# Patient Record
Sex: MALE | Race: WHITE | NOT HISPANIC OR LATINO | Employment: OTHER | URBAN - METROPOLITAN AREA
[De-identification: names, ages, dates, MRNs, and addresses within clinical notes are randomized per-mention and may not be internally consistent; named-entity substitution may affect disease eponyms.]

---

## 2024-04-25 ENCOUNTER — OFFICE VISIT (OUTPATIENT)
Dept: CARDIOLOGY | Facility: CLINIC | Age: 69
End: 2024-04-25
Payer: MEDICARE

## 2024-04-25 ENCOUNTER — HOSPITAL ENCOUNTER (OUTPATIENT)
Dept: VASCULAR MEDICINE | Facility: CLINIC | Age: 69
Discharge: HOME | End: 2024-04-25
Payer: MEDICARE

## 2024-04-25 VITALS
SYSTOLIC BLOOD PRESSURE: 130 MMHG | DIASTOLIC BLOOD PRESSURE: 80 MMHG | HEIGHT: 69 IN | HEART RATE: 91 BPM | WEIGHT: 245 LBS | BODY MASS INDEX: 36.29 KG/M2 | OXYGEN SATURATION: 97 %

## 2024-04-25 DIAGNOSIS — E55.9 VITAMIN D DEFICIENCY: ICD-10-CM

## 2024-04-25 DIAGNOSIS — E78.49 OTHER HYPERLIPIDEMIA: ICD-10-CM

## 2024-04-25 DIAGNOSIS — M79.605 BILATERAL LOWER EXTREMITY PAIN: ICD-10-CM

## 2024-04-25 DIAGNOSIS — M79.604 BILATERAL LOWER EXTREMITY PAIN: ICD-10-CM

## 2024-04-25 DIAGNOSIS — I10 ESSENTIAL HYPERTENSION: ICD-10-CM

## 2024-04-25 DIAGNOSIS — I87.2 VENOUS INSUFFICIENCY: ICD-10-CM

## 2024-04-25 DIAGNOSIS — R60.0 BILATERAL LEG EDEMA: ICD-10-CM

## 2024-04-25 DIAGNOSIS — E83.42 HYPOMAGNESEMIA: ICD-10-CM

## 2024-04-25 DIAGNOSIS — I25.10 CORONARY ARTERY DISEASE INVOLVING NATIVE CORONARY ARTERY OF NATIVE HEART WITHOUT ANGINA PECTORIS: Primary | ICD-10-CM

## 2024-04-25 LAB — BODY SURFACE AREA: 2.33 M2

## 2024-04-25 PROCEDURE — 1159F MED LIST DOCD IN RCRD: CPT | Performed by: INTERNAL MEDICINE

## 2024-04-25 PROCEDURE — 93971 EXTREMITY STUDY: CPT

## 2024-04-25 PROCEDURE — 3075F SYST BP GE 130 - 139MM HG: CPT | Performed by: INTERNAL MEDICINE

## 2024-04-25 PROCEDURE — 93971 EXTREMITY STUDY: CPT | Performed by: INTERNAL MEDICINE

## 2024-04-25 PROCEDURE — 3079F DIAST BP 80-89 MM HG: CPT | Performed by: INTERNAL MEDICINE

## 2024-04-25 PROCEDURE — 1036F TOBACCO NON-USER: CPT | Performed by: INTERNAL MEDICINE

## 2024-04-25 PROCEDURE — 99214 OFFICE O/P EST MOD 30 MIN: CPT | Performed by: INTERNAL MEDICINE

## 2024-04-25 RX ORDER — METFORMIN HYDROCHLORIDE 1000 MG/1
1000 TABLET ORAL 2 TIMES DAILY
COMMUNITY
Start: 2023-05-12

## 2024-04-25 RX ORDER — ACETAMINOPHEN 500 MG
2000 TABLET ORAL
COMMUNITY
Start: 2023-12-06 | End: 2024-04-25 | Stop reason: SDUPTHER

## 2024-04-25 RX ORDER — METOPROLOL SUCCINATE 25 MG/1
25 TABLET, EXTENDED RELEASE ORAL
COMMUNITY
Start: 2023-06-04

## 2024-04-25 RX ORDER — INSULIN GLARGINE 100 [IU]/ML
INJECTION, SOLUTION SUBCUTANEOUS
COMMUNITY

## 2024-04-25 RX ORDER — ATORVASTATIN CALCIUM 20 MG/1
20 TABLET, FILM COATED ORAL DAILY
COMMUNITY

## 2024-04-25 RX ORDER — IBUPROFEN 100 MG/5ML
1 SUSPENSION, ORAL (FINAL DOSE FORM) ORAL
COMMUNITY
Start: 2023-04-04

## 2024-04-25 RX ORDER — BLOOD-GLUCOSE METER
KIT MISCELLANEOUS
COMMUNITY
Start: 2016-08-12

## 2024-04-25 RX ORDER — OMEPRAZOLE 40 MG/1
40 CAPSULE, DELAYED RELEASE ORAL 2 TIMES DAILY
COMMUNITY

## 2024-04-25 RX ORDER — NITROGLYCERIN 0.4 MG/1
TABLET SUBLINGUAL EVERY 5 MIN PRN
COMMUNITY
Start: 2017-04-04

## 2024-04-25 RX ORDER — ISOPROPYL ALCOHOL 70 ML/100ML
SWAB TOPICAL
COMMUNITY
Start: 2024-03-02

## 2024-04-25 RX ORDER — VITS A,C,E/LUTEIN/MINERALS 300MCG-200
200 TABLET ORAL DAILY
Qty: 90 TABLET | Refills: 3 | Status: SHIPPED | OUTPATIENT
Start: 2024-04-25 | End: 2025-04-25

## 2024-04-25 RX ORDER — ACETAMINOPHEN 500 MG
2000 TABLET ORAL
Qty: 90 CAPSULE | Refills: 3 | Status: SHIPPED | OUTPATIENT
Start: 2024-04-25 | End: 2025-04-25

## 2024-04-25 RX ORDER — LISINOPRIL AND HYDROCHLOROTHIAZIDE 12.5; 2 MG/1; MG/1
1 TABLET ORAL 2 TIMES DAILY
COMMUNITY
Start: 2023-04-12 | End: 2024-04-25 | Stop reason: SDUPTHER

## 2024-04-25 RX ORDER — DAPAGLIFLOZIN 10 MG/1
10 TABLET, FILM COATED ORAL
COMMUNITY
Start: 2023-12-06

## 2024-04-25 RX ORDER — VIT A/VIT C/VIT E/ZINC/COPPER 2148-113
1 TABLET ORAL
COMMUNITY
Start: 2023-05-12

## 2024-04-25 RX ORDER — HYDROCODONE BITARTRATE AND ACETAMINOPHEN 5; 325 MG/1; MG/1
1 TABLET ORAL EVERY 6 HOURS PRN
COMMUNITY
Start: 2023-05-28

## 2024-04-25 RX ORDER — NAPROXEN SODIUM 220 MG/1
81 TABLET, FILM COATED ORAL
COMMUNITY
Start: 2023-04-04

## 2024-04-25 RX ORDER — LISINOPRIL AND HYDROCHLOROTHIAZIDE 12.5; 2 MG/1; MG/1
1 TABLET ORAL 2 TIMES DAILY
Qty: 180 TABLET | Refills: 3 | Status: SHIPPED | OUTPATIENT
Start: 2024-04-25 | End: 2025-04-25

## 2024-04-25 RX ORDER — PEN NEEDLE, DIABETIC 31 GX5/16"
NEEDLE, DISPOSABLE MISCELLANEOUS
COMMUNITY
Start: 2023-11-26

## 2024-04-25 ASSESSMENT — ENCOUNTER SYMPTOMS
MYALGIAS: 1
DYSPNEA ON EXERTION: 1

## 2024-04-25 NOTE — PROGRESS NOTES
"Subjective   Garth Sneed is a 69 y.o. male.    Chief Complaint:  Exertional dyspnea.  Leg pains.    HPI    He has had no further hospitalizations since his last visit.  Blood pressures have been improved.  No chest pain or chest pressure.  Has noted problems with swelling and pain in his lower extremities particularly in the calf areas.  He does have chronic venous disease in his lower extremities.  He appears to be compliant with his medications.    He was seen in the emergency room in December 2022 with palpitations and elevated blood pressures. His heart rate was 65. His EKG was according to the notes was unremarkable his laboratory data was normal and he was subsequently discharged for outpatient follow-up.      His most recent cardiac catheterization was done in May 2017. He had a patent stent in the circumflex marginal branch, mild disease in the anterior descending and mild disease in the large dominant right coronary artery. His left ventricular ejection fraction is normal.     Other medical problems, hypertension hyperlipidemia. He has history of degenerative joint disease. Has a history of diabetes mellitus which at times has been difficult to control.    Allergies  Medication    · No Known Drug Allergies   Recorded By: Anna Hanley; 2/13/2014 11:25:20 AM     Family History  Mother    · Family history of cerebrovascular accident (CVA) (V17.1) (Z82.3)   · Family history of diabetes mellitus (V18.0) (Z83.3)  Father    · Family history of diabetes mellitus (V18.0) (Z83.3)     Social History  Problems    · Consumes alcohol occasionally (V49.89) (Z78.9)   · Never a smoker   · No illicit drug use    Review of Systems   Cardiovascular:  Positive for dyspnea on exertion and leg swelling.   Musculoskeletal:  Positive for myalgias.   All other systems reviewed and are negative.      Visit Vitals  /80 (BP Location: Left arm)   Pulse 91   Ht 1.753 m (5' 9\")   Wt 111 kg (245 lb)   SpO2 97%   BMI 36.18 " kg/m²   Smoking Status Former   BSA 2.32 m²        Objective     Constitutional:       Appearance: Not in distress.   Neck:      Vascular: JVD normal.   Pulmonary:      Breath sounds: Normal breath sounds.   Cardiovascular:      Normal rate. Regular rhythm. S1 with normal intensity. S2 with normal intensity.       Murmurs: There is no murmur.      No gallop.    Pulses:     Intact distal pulses.   Edema:     Pretibial: bilateral 1+ edema of the pretibial area.     Ankle: bilateral 1+ edema of the ankle.  Abdominal:      General: Bowel sounds are normal.   Neurological:      Mental Status: Alert and oriented to person, place and time.         Lab Review:   Lab Results   Component Value Date     05/29/2023    K 4.4 05/29/2023     05/29/2023    CO2 25 05/29/2023    BUN 16 05/29/2023    CREATININE 0.84 05/29/2023    GLUCOSE 179 (H) 05/29/2023    CALCIUM 9.9 05/29/2023     Lab Results   Component Value Date    WBC 10.9 05/29/2023    HGB 15.3 05/29/2023    HCT 47.0 05/29/2023    MCV 91 05/29/2023     05/29/2023     Lab Results   Component Value Date    CHOL 134 05/01/2023    TRIG 232 (H) 05/01/2023    HDL 35.9 (A) 05/01/2023       Assessment:    1.  Coronary artery disease.  Status post angioplasty and stenting of the circumflex coronary artery in 2017.  Has had no recent anginal symptoms.    2.  Hypertension.  Blood pressures are well-controlled.  He is on lisinopril 40-25 mg daily.  Latest labs show potassium 4.4, BUN 16, creatinine 0.8.    3.  Hyperlipidemia.  Cholesterol 133, HDL 34, LDL 70.    4.  Bradycardia.  We adjusted his medications and this problem has been improved.    5.  Lower extremity edema pain and edema.  We did a stat venous duplex today.  This was negative for DVT.

## 2024-05-07 ENCOUNTER — APPOINTMENT (OUTPATIENT)
Dept: RADIOLOGY | Facility: HOSPITAL | Age: 69
End: 2024-05-07
Payer: MEDICARE

## 2024-05-07 ENCOUNTER — HOSPITAL ENCOUNTER (EMERGENCY)
Facility: HOSPITAL | Age: 69
Discharge: HOME | End: 2024-05-07
Attending: EMERGENCY MEDICINE
Payer: MEDICARE

## 2024-05-07 ENCOUNTER — APPOINTMENT (OUTPATIENT)
Dept: CARDIOLOGY | Facility: HOSPITAL | Age: 69
End: 2024-05-07
Payer: MEDICARE

## 2024-05-07 VITALS
TEMPERATURE: 97.6 F | OXYGEN SATURATION: 96 % | HEART RATE: 72 BPM | RESPIRATION RATE: 22 BRPM | DIASTOLIC BLOOD PRESSURE: 93 MMHG | SYSTOLIC BLOOD PRESSURE: 162 MMHG

## 2024-05-07 DIAGNOSIS — R42 VERTIGO: Primary | ICD-10-CM

## 2024-05-07 LAB
ALBUMIN SERPL BCP-MCNC: 4.7 G/DL (ref 3.4–5)
ALP SERPL-CCNC: 63 U/L (ref 33–136)
ALT SERPL W P-5'-P-CCNC: 35 U/L (ref 10–52)
ANION GAP SERPL CALC-SCNC: 14 MMOL/L (ref 10–20)
APPEARANCE UR: CLEAR
AST SERPL W P-5'-P-CCNC: 29 U/L (ref 9–39)
BASOPHILS # BLD AUTO: 0.03 X10*3/UL (ref 0–0.1)
BASOPHILS NFR BLD AUTO: 0.4 %
BILIRUB SERPL-MCNC: 0.6 MG/DL (ref 0–1.2)
BILIRUB UR STRIP.AUTO-MCNC: NEGATIVE MG/DL
BNP SERPL-MCNC: 12 PG/ML (ref 0–99)
BUN SERPL-MCNC: 19 MG/DL (ref 6–23)
CALCIUM SERPL-MCNC: 9.9 MG/DL (ref 8.6–10.3)
CARDIAC TROPONIN I PNL SERPL HS: 5 NG/L (ref 0–20)
CARDIAC TROPONIN I PNL SERPL HS: 5 NG/L (ref 0–20)
CHLORIDE SERPL-SCNC: 98 MMOL/L (ref 98–107)
CO2 SERPL-SCNC: 25 MMOL/L (ref 21–32)
COLOR UR: NORMAL
CREAT SERPL-MCNC: 0.85 MG/DL (ref 0.5–1.3)
EGFRCR SERPLBLD CKD-EPI 2021: >90 ML/MIN/1.73M*2
EOSINOPHIL # BLD AUTO: 0.11 X10*3/UL (ref 0–0.7)
EOSINOPHIL NFR BLD AUTO: 1.5 %
ERYTHROCYTE [DISTWIDTH] IN BLOOD BY AUTOMATED COUNT: 13.4 % (ref 11.5–14.5)
GLUCOSE SERPL-MCNC: 161 MG/DL (ref 74–99)
GLUCOSE UR STRIP.AUTO-MCNC: NORMAL MG/DL
HCT VFR BLD AUTO: 44.3 % (ref 41–52)
HGB BLD-MCNC: 15 G/DL (ref 13.5–17.5)
IMM GRANULOCYTES # BLD AUTO: 0.05 X10*3/UL (ref 0–0.7)
IMM GRANULOCYTES NFR BLD AUTO: 0.7 % (ref 0–0.9)
KETONES UR STRIP.AUTO-MCNC: NEGATIVE MG/DL
LEUKOCYTE ESTERASE UR QL STRIP.AUTO: NEGATIVE
LYMPHOCYTES # BLD AUTO: 1.58 X10*3/UL (ref 1.2–4.8)
LYMPHOCYTES NFR BLD AUTO: 21.1 %
MAGNESIUM SERPL-MCNC: 1.69 MG/DL (ref 1.6–2.4)
MCH RBC QN AUTO: 30.2 PG (ref 26–34)
MCHC RBC AUTO-ENTMCNC: 33.9 G/DL (ref 32–36)
MCV RBC AUTO: 89 FL (ref 80–100)
MONOCYTES # BLD AUTO: 0.57 X10*3/UL (ref 0.1–1)
MONOCYTES NFR BLD AUTO: 7.6 %
NEUTROPHILS # BLD AUTO: 5.16 X10*3/UL (ref 1.2–7.7)
NEUTROPHILS NFR BLD AUTO: 68.7 %
NITRITE UR QL STRIP.AUTO: NEGATIVE
NRBC BLD-RTO: 0 /100 WBCS (ref 0–0)
PH UR STRIP.AUTO: 5 [PH]
PLATELET # BLD AUTO: 223 X10*3/UL (ref 150–450)
POTASSIUM SERPL-SCNC: 4 MMOL/L (ref 3.5–5.3)
PROT SERPL-MCNC: 7.7 G/DL (ref 6.4–8.2)
PROT UR STRIP.AUTO-MCNC: NEGATIVE MG/DL
RBC # BLD AUTO: 4.96 X10*6/UL (ref 4.5–5.9)
RBC # UR STRIP.AUTO: NEGATIVE /UL
SODIUM SERPL-SCNC: 133 MMOL/L (ref 136–145)
SP GR UR STRIP.AUTO: 1.02
UROBILINOGEN UR STRIP.AUTO-MCNC: NORMAL MG/DL
WBC # BLD AUTO: 7.5 X10*3/UL (ref 4.4–11.3)

## 2024-05-07 PROCEDURE — 85025 COMPLETE CBC W/AUTO DIFF WBC: CPT | Performed by: NURSE PRACTITIONER

## 2024-05-07 PROCEDURE — 80053 COMPREHEN METABOLIC PANEL: CPT | Performed by: NURSE PRACTITIONER

## 2024-05-07 PROCEDURE — 96360 HYDRATION IV INFUSION INIT: CPT

## 2024-05-07 PROCEDURE — 36415 COLL VENOUS BLD VENIPUNCTURE: CPT | Performed by: EMERGENCY MEDICINE

## 2024-05-07 PROCEDURE — 93005 ELECTROCARDIOGRAM TRACING: CPT

## 2024-05-07 PROCEDURE — 71046 X-RAY EXAM CHEST 2 VIEWS: CPT | Mod: FOREIGN READ | Performed by: RADIOLOGY

## 2024-05-07 PROCEDURE — 99285 EMERGENCY DEPT VISIT HI MDM: CPT | Mod: 25

## 2024-05-07 PROCEDURE — 83880 ASSAY OF NATRIURETIC PEPTIDE: CPT | Performed by: NURSE PRACTITIONER

## 2024-05-07 PROCEDURE — 70450 CT HEAD/BRAIN W/O DYE: CPT

## 2024-05-07 PROCEDURE — 84484 ASSAY OF TROPONIN QUANT: CPT | Performed by: NURSE PRACTITIONER

## 2024-05-07 PROCEDURE — 2500000004 HC RX 250 GENERAL PHARMACY W/ HCPCS (ALT 636 FOR OP/ED): Performed by: EMERGENCY MEDICINE

## 2024-05-07 PROCEDURE — 2500000001 HC RX 250 WO HCPCS SELF ADMINISTERED DRUGS (ALT 637 FOR MEDICARE OP): Performed by: EMERGENCY MEDICINE

## 2024-05-07 PROCEDURE — 81003 URINALYSIS AUTO W/O SCOPE: CPT | Performed by: NURSE PRACTITIONER

## 2024-05-07 PROCEDURE — 70450 CT HEAD/BRAIN W/O DYE: CPT | Performed by: STUDENT IN AN ORGANIZED HEALTH CARE EDUCATION/TRAINING PROGRAM

## 2024-05-07 PROCEDURE — 36415 COLL VENOUS BLD VENIPUNCTURE: CPT | Performed by: NURSE PRACTITIONER

## 2024-05-07 PROCEDURE — 83735 ASSAY OF MAGNESIUM: CPT | Performed by: NURSE PRACTITIONER

## 2024-05-07 PROCEDURE — 84075 ASSAY ALKALINE PHOSPHATASE: CPT | Performed by: NURSE PRACTITIONER

## 2024-05-07 PROCEDURE — 71046 X-RAY EXAM CHEST 2 VIEWS: CPT

## 2024-05-07 PROCEDURE — 84484 ASSAY OF TROPONIN QUANT: CPT | Mod: 91 | Performed by: EMERGENCY MEDICINE

## 2024-05-07 RX ORDER — MECLIZINE HYDROCHLORIDE 25 MG/1
25 TABLET ORAL 3 TIMES DAILY PRN
Qty: 20 TABLET | Refills: 0 | Status: SHIPPED | OUTPATIENT
Start: 2024-05-07 | End: 2024-05-17

## 2024-05-07 RX ORDER — MECLIZINE HYDROCHLORIDE 25 MG/1
25 TABLET ORAL ONCE
Status: COMPLETED | OUTPATIENT
Start: 2024-05-07 | End: 2024-05-07

## 2024-05-07 RX ADMIN — MECLIZINE HYDROCHLORIDE 25 MG: 25 TABLET ORAL at 19:46

## 2024-05-07 RX ADMIN — SODIUM CHLORIDE, POTASSIUM CHLORIDE, SODIUM LACTATE AND CALCIUM CHLORIDE 1000 ML: 600; 310; 30; 20 INJECTION, SOLUTION INTRAVENOUS at 19:45

## 2024-05-07 ASSESSMENT — LIFESTYLE VARIABLES
HAVE YOU EVER FELT YOU SHOULD CUT DOWN ON YOUR DRINKING: NO
TOTAL SCORE: 0
HAVE PEOPLE ANNOYED YOU BY CRITICIZING YOUR DRINKING: NO
EVER HAD A DRINK FIRST THING IN THE MORNING TO STEADY YOUR NERVES TO GET RID OF A HANGOVER: NO
EVER FELT BAD OR GUILTY ABOUT YOUR DRINKING: NO

## 2024-05-07 ASSESSMENT — PAIN DESCRIPTION - PAIN TYPE: TYPE: CHRONIC PAIN

## 2024-05-07 ASSESSMENT — PAIN SCALES - GENERAL
PAINLEVEL_OUTOF10: 4
PAINLEVEL_OUTOF10: 0 - NO PAIN

## 2024-05-07 ASSESSMENT — PAIN - FUNCTIONAL ASSESSMENT: PAIN_FUNCTIONAL_ASSESSMENT: 0-10

## 2024-05-07 ASSESSMENT — COLUMBIA-SUICIDE SEVERITY RATING SCALE - C-SSRS
1. IN THE PAST MONTH, HAVE YOU WISHED YOU WERE DEAD OR WISHED YOU COULD GO TO SLEEP AND NOT WAKE UP?: NO
6. HAVE YOU EVER DONE ANYTHING, STARTED TO DO ANYTHING, OR PREPARED TO DO ANYTHING TO END YOUR LIFE?: NO
2. HAVE YOU ACTUALLY HAD ANY THOUGHTS OF KILLING YOURSELF?: NO

## 2024-05-07 NOTE — ED TRIAGE NOTES
Secondary to patient volumes and overcrowding, I performed a brief medical screening exam of the patient in triage, as the patient awaits space in the main ED.    History of Present Illness:  Garth Sneed presents with   Chief Complaint   Patient presents with    Dizziness    Chest Pain       Physical Exam:  General - In no acute distress  Respiratory - Breathing comfortably  Cardiac - Normal S1, S2, no m/g/r  Neuro - No focal neurologic deficits. Cranial nerves normal as tested from III through XII.  Bilateral upper and lower extremity strengths intact 5/5, sensation intact, DTRs 2+, no drift.  No neglect, no dysarthria, no word finding abnormality, finger-to-nose normal.   Eyes - EOMI, PERRL.  Test of skew negative normal.    Medical Decision Making:  Patient will require further evaluation in the main ED.    Initial diagnostic tests were ordered from triage.    The patient demonstrates understanding that this initial evaluation is a brief medical screening exam and the expectation is that they await for space in the main ED to be further evaluated.  The patient understands that, if they leave prior to further evaluation in the main ED after this initial evaluation in triage, they are doing so under their own accord knowing that their evaluation/work-up is not yet complete. The patient also understands that any preliminary diagnostic results, including abnormalities, may not be shared with them, if they choose to leave prior to further evaluation in the main ED.

## 2024-05-07 NOTE — ED PROVIDER NOTES
HPI   Chief Complaint   Patient presents with    Dizziness    Chest Pain       Patient is a 69-year-old male, medical history significant for hypertension, coronary vascular disease, presents to the emergency department with dizziness.  He describes a sensation of motion.  He states it started today rather suddenly.  He states that he was nauseated and felt diaphoretic.  Triage does note chest pain, however the patient states he did not have chest pain just felt nauseated with his dizziness.  He thinks that he may have had vertigo in the past.  He denies changes in vision.  He denies trouble speaking or swallowing.  He denies any change in gait.  By the time the patient arrived, his symptoms have actually markedly improved.                          William Coma Scale Score: 15                     Patient History   Past Medical History:   Diagnosis Date    Abnormal findings on diagnostic imaging of other parts of digestive tract     Abnormal UGI series    Cervicalgia     Cervicalgia    Other cervical disc displacement, unspecified cervical region     Cervical disc herniation    Other specified postprocedural states     History of cardiac catheterization    Personal history of other diseases of the circulatory system 12/28/2022    History of sinus bradycardia    Personal history of other diseases of the circulatory system     History of essential hypertension    Personal history of other diseases of the digestive system     History of gastritis    Personal history of other diseases of the digestive system     History of gastroesophageal reflux (GERD)    Personal history of other diseases of the musculoskeletal system and connective tissue     History of backache    Personal history of other diseases of the nervous system and sense organs     History of sleep disturbance    Personal history of other diseases of the respiratory system 03/14/2018    History of acute sinusitis    Personal history of other diseases of the  respiratory system 08/23/2018    History of acute bronchitis    Personal history of other endocrine, nutritional and metabolic disease 03/19/2018    History of hyperlipidemia    Personal history of other medical treatment     History of nuclear stress test    Personal history of other specified conditions     History of chest pain    Radiculopathy, site unspecified     Radiculitis    Spondylosis, unspecified     DJD (degenerative joint disease) of lumbar spine     Past Surgical History:   Procedure Laterality Date    CT HEAD ANGIO W AND WO IV CONTRAST  4/10/2019    CT HEAD ANGIO W AND WO IV CONTRAST 4/10/2019 [facility]    OTHER SURGICAL HISTORY  02/14/2018    Previous Stent Placement     No family history on file.  Social History     Tobacco Use    Smoking status: Former     Types: Cigarettes    Smokeless tobacco: Never   Substance Use Topics    Alcohol use: Not Currently    Drug use: Never       Physical Exam   ED Triage Vitals [05/07/24 1649]   Temperature Heart Rate Respirations BP   36.4 °C (97.6 °F) 91 18 168/81      Pulse Ox Temp Source Heart Rate Source Patient Position   97 % Tympanic Monitor Sitting      BP Location FiO2 (%)     Right arm --       Physical Exam  Vitals and nursing note reviewed.   Constitutional:       General: He is not in acute distress.     Appearance: He is well-developed.   HENT:      Head: Normocephalic and atraumatic.   Eyes:      Extraocular Movements: Extraocular movements intact.      Conjunctiva/sclera: Conjunctivae normal.      Pupils: Pupils are equal, round, and reactive to light.   Cardiovascular:      Rate and Rhythm: Normal rate and regular rhythm.      Heart sounds: No murmur heard.  Pulmonary:      Effort: Pulmonary effort is normal. No respiratory distress.      Breath sounds: Normal breath sounds.   Abdominal:      Palpations: Abdomen is soft.      Tenderness: There is no abdominal tenderness.   Musculoskeletal:         General: No swelling.      Cervical  back: Neck supple.   Skin:     General: Skin is warm and dry.      Capillary Refill: Capillary refill takes less than 2 seconds.   Neurological:      General: No focal deficit present.      Mental Status: He is alert and oriented to person, place, and time.   Psychiatric:         Mood and Affect: Mood normal.         ED Course & [facility]   ED Course as of 05/07/24 2106 Tue May 07, 2024   2051 CBC within normal limits. [MK]   2052 CMP unremarkable. []   2052 Urine shows no evidence of infection. []   2052 Troponin normal. [MK]   2052 CT head shows no acute intracranial normality. [MK]   2052 Chest x-ray demonstrates normal cardiac silhouette without infiltrates or effusions. []   2056 Reviewed troponin normal. []      ED Course User Index  [] Betito Akers MD         Diagnoses as of 05/07/24 2106   Vertigo       Medical Decision Making  Medical Decision Making: Clinically, the patient's symptoms do seem vertiginous in nature.  They have resolved by the time he arrived.  He has a normal neurologic examination.  NIH is 0.  There is no difficulty with rapid alternating movements.  He has no nystagmus.  Workup was started in triage and labs were obtained.  Labs are unremarkable.  Patient was given fluids and meclizine.  He had no return of symptoms.  He ambulated without symptoms and wants to attempt outpatient therapy and I feel this is reasonable.    EKG interpreted by myself (ED attending physician): Sinus rhythm.  Rate of 91.  Normal axis intervals.  No acute ischemia.  No STEMI.    Differential Diagnoses Considered: Electrolyte disturbance, dehydration, vertigo, posterior stroke    Chronic Medical Conditions Significantly Affecting Care: Hypertension, coronary vascular disease    External Records Reviewed: I reviewed recent and relevant outside records including: Outpatient medication reconciliation    Independent Interpretation of Studies:  I independently interpreted: Chest x-ray and CT obtained  reviewed    Escalation of Care:  Appropriate for outpatient management after discussion with patient    Social Determinants of Health Significantly Affecting Care:  No social determinant    Prescription Drug Consideration: This meclizine as needed    Diagnostic testing considered: Noncontributory            Procedure  Procedures     Betito Akers MD  05/07/24 [id number]

## 2024-05-07 NOTE — ED TRIAGE NOTES
Pt presents to ED for report of dizziness that began 4 hours ago while working on his computer. Pt reports chest discomfort. Pt is diaphoretic on arrival. Pt denies vision changes, numbness/tingling in extremities.

## 2024-05-08 LAB — HOLD SPECIMEN: NORMAL

## 2024-05-10 LAB
ATRIAL RATE: 91 BPM
P AXIS: 54 DEGREES
PR INTERVAL: 188 MS
Q ONSET: 254 MS
QRS COUNT: 15 BEATS
QRS DURATION: 94 MS
QT INTERVAL: 368 MS
QTC CALCULATION(BAZETT): 453 MS
QTC FREDERICIA: 423 MS
R AXIS: 8 DEGREES
T AXIS: 32 DEGREES
T OFFSET: 438 MS
VENTRICULAR RATE: 91 BPM

## 2024-05-16 ENCOUNTER — APPOINTMENT (OUTPATIENT)
Dept: SURGERY | Facility: CLINIC | Age: 69
End: 2024-05-16
Payer: MEDICARE

## 2024-09-26 PROBLEM — M51.26 LUMBAR DISC HERNIATION: Status: ACTIVE | Noted: 2024-09-26

## 2024-09-26 PROBLEM — R74.8 ELEVATED LIVER ENZYMES: Status: ACTIVE | Noted: 2024-09-26

## 2024-09-26 PROBLEM — Z95.5 STATUS POST CORONARY ARTERY STENT PLACEMENT: Status: ACTIVE | Noted: 2024-09-26

## 2024-09-26 PROBLEM — E11.40 DIABETIC NEUROPATHY (MULTI): Status: ACTIVE | Noted: 2024-09-26

## 2024-09-26 PROBLEM — E66.01 SEVERE OBESITY (MULTI): Status: ACTIVE | Noted: 2023-07-18

## 2024-09-26 PROBLEM — M35.3 POLYMYALGIA RHEUMATICA (MULTI): Status: ACTIVE | Noted: 2023-07-18

## 2024-09-26 PROBLEM — G47.00 INSOMNIA: Status: ACTIVE | Noted: 2024-09-26

## 2024-09-26 PROBLEM — E78.5 HYPERLIPIDEMIA: Status: ACTIVE | Noted: 2024-04-25

## 2024-09-26 PROBLEM — Z98.890 HISTORY OF CARDIAC CATHETERIZATION: Status: ACTIVE | Noted: 2024-09-26

## 2024-09-26 PROBLEM — I49.3 MULTIPLE PREMATURE VENTRICULAR COMPLEXES: Status: ACTIVE | Noted: 2024-09-26

## 2024-09-26 PROBLEM — E11.319 DIABETIC RETINOPATHY (MULTI): Status: ACTIVE | Noted: 2024-09-26

## 2024-09-26 PROBLEM — D12.6 TUBULAR ADENOMA OF COLON: Status: ACTIVE | Noted: 2024-09-26

## 2024-09-26 PROBLEM — M79.10 MYALGIA: Status: ACTIVE | Noted: 2024-09-26

## 2024-09-26 PROBLEM — R00.2 PALPITATIONS: Status: ACTIVE | Noted: 2024-09-26

## 2024-09-26 PROBLEM — Z85.46 HISTORY OF PROSTATE CANCER: Status: ACTIVE | Noted: 2023-10-17

## 2024-09-26 PROBLEM — R06.09 EXERTIONAL DYSPNEA: Status: ACTIVE | Noted: 2024-09-26

## 2024-10-18 ENCOUNTER — APPOINTMENT (OUTPATIENT)
Dept: CARDIOLOGY | Facility: CLINIC | Age: 69
End: 2024-10-18
Payer: MEDICARE

## 2024-10-18 PROBLEM — E66.812 CLASS 2 OBESITY WITH BODY MASS INDEX (BMI) OF 36.0 TO 36.9 IN ADULT: Status: ACTIVE | Noted: 2023-07-18

## 2024-12-16 ENCOUNTER — APPOINTMENT (OUTPATIENT)
Dept: RADIOLOGY | Facility: HOSPITAL | Age: 69
End: 2024-12-16
Payer: MEDICARE

## 2024-12-16 ENCOUNTER — HOSPITAL ENCOUNTER (EMERGENCY)
Facility: HOSPITAL | Age: 69
Discharge: HOME | End: 2024-12-16
Payer: MEDICARE

## 2024-12-16 ENCOUNTER — APPOINTMENT (OUTPATIENT)
Dept: CARDIOLOGY | Facility: HOSPITAL | Age: 69
End: 2024-12-16
Payer: MEDICARE

## 2024-12-16 VITALS
DIASTOLIC BLOOD PRESSURE: 86 MMHG | BODY MASS INDEX: 36.24 KG/M2 | WEIGHT: 244.71 LBS | RESPIRATION RATE: 16 BRPM | SYSTOLIC BLOOD PRESSURE: 153 MMHG | OXYGEN SATURATION: 95 % | TEMPERATURE: 97.9 F | HEART RATE: 90 BPM | HEIGHT: 69 IN

## 2024-12-16 DIAGNOSIS — F41.9 ANXIETY: ICD-10-CM

## 2024-12-16 DIAGNOSIS — R10.9 ABDOMINAL PAIN, UNSPECIFIED ABDOMINAL LOCATION: Primary | ICD-10-CM

## 2024-12-16 LAB
ALBUMIN SERPL BCP-MCNC: 4.7 G/DL (ref 3.4–5)
ALP SERPL-CCNC: 59 U/L (ref 33–136)
ALT SERPL W P-5'-P-CCNC: 36 U/L (ref 10–52)
ANION GAP SERPL CALC-SCNC: 11 MMOL/L (ref 10–20)
APPEARANCE UR: CLEAR
AST SERPL W P-5'-P-CCNC: 32 U/L (ref 9–39)
BASOPHILS # BLD AUTO: 0.03 X10*3/UL (ref 0–0.1)
BASOPHILS NFR BLD AUTO: 0.4 %
BILIRUB SERPL-MCNC: 0.5 MG/DL (ref 0–1.2)
BILIRUB UR STRIP.AUTO-MCNC: NEGATIVE MG/DL
BUN SERPL-MCNC: 23 MG/DL (ref 6–23)
CALCIUM SERPL-MCNC: 9.6 MG/DL (ref 8.6–10.3)
CARDIAC TROPONIN I PNL SERPL HS: 7 NG/L (ref 0–20)
CHLORIDE SERPL-SCNC: 103 MMOL/L (ref 98–107)
CO2 SERPL-SCNC: 25 MMOL/L (ref 21–32)
COLOR UR: NORMAL
CREAT SERPL-MCNC: 0.83 MG/DL (ref 0.5–1.3)
EGFRCR SERPLBLD CKD-EPI 2021: >90 ML/MIN/1.73M*2
EOSINOPHIL # BLD AUTO: 0.07 X10*3/UL (ref 0–0.7)
EOSINOPHIL NFR BLD AUTO: 0.9 %
ERYTHROCYTE [DISTWIDTH] IN BLOOD BY AUTOMATED COUNT: 13.6 % (ref 11.5–14.5)
GLUCOSE SERPL-MCNC: 103 MG/DL (ref 74–99)
GLUCOSE UR STRIP.AUTO-MCNC: NORMAL MG/DL
HCT VFR BLD AUTO: 43 % (ref 41–52)
HGB BLD-MCNC: 14.3 G/DL (ref 13.5–17.5)
IMM GRANULOCYTES # BLD AUTO: 0.03 X10*3/UL (ref 0–0.7)
IMM GRANULOCYTES NFR BLD AUTO: 0.4 % (ref 0–0.9)
KETONES UR STRIP.AUTO-MCNC: NEGATIVE MG/DL
LEUKOCYTE ESTERASE UR QL STRIP.AUTO: NEGATIVE
LIPASE SERPL-CCNC: 23 U/L (ref 9–82)
LYMPHOCYTES # BLD AUTO: 1.43 X10*3/UL (ref 1.2–4.8)
LYMPHOCYTES NFR BLD AUTO: 17.4 %
MCH RBC QN AUTO: 29.9 PG (ref 26–34)
MCHC RBC AUTO-ENTMCNC: 33.3 G/DL (ref 32–36)
MCV RBC AUTO: 90 FL (ref 80–100)
MONOCYTES # BLD AUTO: 0.63 X10*3/UL (ref 0.1–1)
MONOCYTES NFR BLD AUTO: 7.7 %
NEUTROPHILS # BLD AUTO: 6.01 X10*3/UL (ref 1.2–7.7)
NEUTROPHILS NFR BLD AUTO: 73.2 %
NITRITE UR QL STRIP.AUTO: NEGATIVE
NRBC BLD-RTO: 0 /100 WBCS (ref 0–0)
PH UR STRIP.AUTO: 5 [PH]
PLATELET # BLD AUTO: 195 X10*3/UL (ref 150–450)
POTASSIUM SERPL-SCNC: 4.1 MMOL/L (ref 3.5–5.3)
PROT SERPL-MCNC: 7.4 G/DL (ref 6.4–8.2)
PROT UR STRIP.AUTO-MCNC: NEGATIVE MG/DL
RBC # BLD AUTO: 4.78 X10*6/UL (ref 4.5–5.9)
RBC # UR STRIP.AUTO: NEGATIVE /UL
SODIUM SERPL-SCNC: 135 MMOL/L (ref 136–145)
SP GR UR STRIP.AUTO: 1.02
UROBILINOGEN UR STRIP.AUTO-MCNC: NORMAL MG/DL
WBC # BLD AUTO: 8.2 X10*3/UL (ref 4.4–11.3)

## 2024-12-16 PROCEDURE — 36415 COLL VENOUS BLD VENIPUNCTURE: CPT | Performed by: NURSE PRACTITIONER

## 2024-12-16 PROCEDURE — 74177 CT ABD & PELVIS W/CONTRAST: CPT | Performed by: RADIOLOGY

## 2024-12-16 PROCEDURE — 93005 ELECTROCARDIOGRAM TRACING: CPT

## 2024-12-16 PROCEDURE — 82565 ASSAY OF CREATININE: CPT | Performed by: NURSE PRACTITIONER

## 2024-12-16 PROCEDURE — 99285 EMERGENCY DEPT VISIT HI MDM: CPT | Mod: 25

## 2024-12-16 PROCEDURE — 2550000001 HC RX 255 CONTRASTS: Performed by: NURSE PRACTITIONER

## 2024-12-16 PROCEDURE — 81003 URINALYSIS AUTO W/O SCOPE: CPT | Performed by: NURSE PRACTITIONER

## 2024-12-16 PROCEDURE — 71046 X-RAY EXAM CHEST 2 VIEWS: CPT | Performed by: RADIOLOGY

## 2024-12-16 PROCEDURE — 85025 COMPLETE CBC W/AUTO DIFF WBC: CPT | Performed by: NURSE PRACTITIONER

## 2024-12-16 PROCEDURE — 83690 ASSAY OF LIPASE: CPT | Performed by: NURSE PRACTITIONER

## 2024-12-16 PROCEDURE — 71046 X-RAY EXAM CHEST 2 VIEWS: CPT

## 2024-12-16 PROCEDURE — 84484 ASSAY OF TROPONIN QUANT: CPT | Performed by: NURSE PRACTITIONER

## 2024-12-16 PROCEDURE — 74177 CT ABD & PELVIS W/CONTRAST: CPT

## 2024-12-16 RX ORDER — HYDROXYZINE HYDROCHLORIDE 25 MG/1
25 TABLET, FILM COATED ORAL EVERY 6 HOURS
Qty: 12 TABLET | Refills: 0 | Status: SHIPPED | OUTPATIENT
Start: 2024-12-16 | End: 2024-12-19

## 2024-12-16 ASSESSMENT — COLUMBIA-SUICIDE SEVERITY RATING SCALE - C-SSRS
2. HAVE YOU ACTUALLY HAD ANY THOUGHTS OF KILLING YOURSELF?: NO
1. IN THE PAST MONTH, HAVE YOU WISHED YOU WERE DEAD OR WISHED YOU COULD GO TO SLEEP AND NOT WAKE UP?: NO
6. HAVE YOU EVER DONE ANYTHING, STARTED TO DO ANYTHING, OR PREPARED TO DO ANYTHING TO END YOUR LIFE?: NO

## 2024-12-16 ASSESSMENT — PAIN DESCRIPTION - DESCRIPTORS: DESCRIPTORS: BURNING

## 2024-12-16 ASSESSMENT — PAIN SCALES - GENERAL: PAINLEVEL_OUTOF10: 7

## 2024-12-16 ASSESSMENT — PAIN - FUNCTIONAL ASSESSMENT: PAIN_FUNCTIONAL_ASSESSMENT: 0-10

## 2024-12-16 NOTE — ED TRIAGE NOTES
TRIAGE NOTE   I saw the patient as the Clinician in Triage and performed a brief history and physical exam, established acuity, and ordered appropriate tests to develop basic plan of care. Patient will be seen by an RICARDO, resident and/or physician who will independently evaluate the patient. Please see subsequent provider notes for further details and disposition.     Brief HPI: In brief, Garth Sneed is a 69 y.o. male significant PMH for HTN, HLD, T2DM, prostate cancer s/p radiation and CAD s/p PTCA presenting to ED today from home by himself for evaluation of abdominal pain.  Patient states he is under a lot of stress as he just found out his son was murdered.  Sudden lay this morning the patient developed pain in the upper abdomen, currently rated 6/10.  Pain has been steady since this morning.  No radiation of pain.  No prior abdominal surgeries.  Denies fever/chills, cough/cold symptoms, chest pain, shortness of breath, nausea/vomiting, dysuria/hematuria, change in bowel habits or any other complaints.  No smoking, EtOH or IV drugs.  PCP at [facility].    Focused Physical exam:   General: Older  male, awake and alert, oriented x 3.  Well-nourished and hydrated.  Nontoxic looking  Skin: Pink, warm and dry  Cardiac: Regular rate and rhythm.  Pulmonary: Clear bilaterally.  Abdomen: Moderately distended, tender in the upper quadrants bilaterally.  No rebound or guarding.  No palpable organomegaly.  No CVA tenderness    Plan/MDM:     69 y.o. male significant PMH for HTN, HLD, T2DM, prostate cancer s/p radiation and CAD s/p PTCA with no prior abdominal surgeries and is evaluated at the bedside for upper abdominal pain that began this morning.  Patient is under a lot of stress since his son has recently been murdered.  Vital signs within normal limits.  Afebrile.  Lungs clear, moderately distended with bowel sounds, tender in the upper quadrants.  IV established, basic labs, EKG, chest x-ray and CT  abdomen/pelvis with contrast will be performed in preparation for further evaluation in the main ED.  Patient is agreeable to this plan.    Please see subsequent provider note for further details and disposition

## 2024-12-17 LAB — HOLD SPECIMEN: NORMAL

## 2024-12-19 LAB
ATRIAL RATE: 89 BPM
P AXIS: 59 DEGREES
P OFFSET: 183 MS
P ONSET: 121 MS
PR INTERVAL: 200 MS
Q ONSET: 221 MS
QRS COUNT: 15 BEATS
QRS DURATION: 86 MS
QT INTERVAL: 364 MS
QTC CALCULATION(BAZETT): 442 MS
QTC FREDERICIA: 414 MS
R AXIS: 11 DEGREES
T AXIS: 63 DEGREES
T OFFSET: 403 MS
VENTRICULAR RATE: 89 BPM

## 2025-01-10 ENCOUNTER — OFFICE VISIT (OUTPATIENT)
Dept: CARDIOLOGY | Facility: CLINIC | Age: 70
End: 2025-01-10
Payer: COMMERCIAL

## 2025-01-10 VITALS
BODY MASS INDEX: 37.03 KG/M2 | HEART RATE: 105 BPM | OXYGEN SATURATION: 93 % | DIASTOLIC BLOOD PRESSURE: 80 MMHG | SYSTOLIC BLOOD PRESSURE: 130 MMHG | WEIGHT: 250 LBS | HEIGHT: 69 IN

## 2025-01-10 DIAGNOSIS — I10 ESSENTIAL HYPERTENSION: ICD-10-CM

## 2025-01-10 DIAGNOSIS — I49.3 MULTIPLE PREMATURE VENTRICULAR COMPLEXES: ICD-10-CM

## 2025-01-10 DIAGNOSIS — I25.10 CORONARY ARTERY DISEASE INVOLVING NATIVE CORONARY ARTERY OF NATIVE HEART WITHOUT ANGINA PECTORIS: Primary | ICD-10-CM

## 2025-01-10 DIAGNOSIS — Z95.5 STATUS POST CORONARY ARTERY STENT PLACEMENT: ICD-10-CM

## 2025-01-10 DIAGNOSIS — R06.09 EXERTIONAL DYSPNEA: ICD-10-CM

## 2025-01-10 DIAGNOSIS — E78.49 OTHER HYPERLIPIDEMIA: ICD-10-CM

## 2025-01-10 DIAGNOSIS — R00.2 PALPITATIONS: ICD-10-CM

## 2025-01-10 DIAGNOSIS — I87.2 VENOUS INSUFFICIENCY: ICD-10-CM

## 2025-01-10 PROBLEM — E78.5 HYPERLIPIDEMIA: Status: RESOLVED | Noted: 2024-04-25 | Resolved: 2025-01-10

## 2025-01-10 PROCEDURE — 3079F DIAST BP 80-89 MM HG: CPT | Performed by: INTERNAL MEDICINE

## 2025-01-10 PROCEDURE — 1159F MED LIST DOCD IN RCRD: CPT | Performed by: INTERNAL MEDICINE

## 2025-01-10 PROCEDURE — 99213 OFFICE O/P EST LOW 20 MIN: CPT | Performed by: INTERNAL MEDICINE

## 2025-01-10 PROCEDURE — 3008F BODY MASS INDEX DOCD: CPT | Performed by: INTERNAL MEDICINE

## 2025-01-10 PROCEDURE — 1036F TOBACCO NON-USER: CPT | Performed by: INTERNAL MEDICINE

## 2025-01-10 PROCEDURE — 3075F SYST BP GE 130 - 139MM HG: CPT | Performed by: INTERNAL MEDICINE

## 2025-01-10 RX ORDER — LISINOPRIL 20 MG/1
20 TABLET ORAL DAILY
COMMUNITY
End: 2025-01-10 | Stop reason: ALTCHOICE

## 2025-01-10 RX ORDER — FAMOTIDINE 20 MG/1
TABLET, FILM COATED ORAL
COMMUNITY

## 2025-01-10 RX ORDER — DOXEPIN HYDROCHLORIDE 50 MG/1
1 CAPSULE ORAL NIGHTLY
COMMUNITY

## 2025-01-10 RX ORDER — LANOLIN ALCOHOL/MO/W.PET/CERES
CREAM (GRAM) TOPICAL
COMMUNITY
Start: 2025-01-05

## 2025-01-10 RX ORDER — MELOXICAM 15 MG/1
TABLET ORAL
COMMUNITY
Start: 2024-12-23

## 2025-01-10 NOTE — PATIENT INSTRUCTIONS
No changes in medications.    On your next visit we are going to do an ultrasound test on your heart.

## 2025-01-10 NOTE — PROGRESS NOTES
Subjective   Garth Sneed is a 69 y.o. male.    Chief Complaint:  Follow-up coronary artery disease.  Exertional dyspnea.    HPI    He presented to the emergency room on December 16, 2024.  He had found out that his son had been murdered.  His workup in the emergency room was negative.  He had a CT of the abdomen which was negative.  He also gets occasional episodes of angina.  He describes pressure across the anterior chest.  Usually last for a few hours.  Not associate with activities.  Relieved by Pepcid.    He was seen in the emergency room in December 2022 with palpitations and elevated blood pressures. His heart rate was 65. His EKG was according to the notes was unremarkable his laboratory data was normal and he was subsequently discharged for outpatient follow-up.      His most recent cardiac catheterization was done in May 2017. He had a patent stent in the circumflex marginal branch, mild disease in the anterior descending and mild disease in the large dominant right coronary artery. His left ventricular ejection fraction is normal.     Other medical problems, hypertension hyperlipidemia. He has history of degenerative joint disease. Has a history of diabetes mellitus which at times has been difficult to control.     Allergies  Medication    · No Known Drug Allergies   Recorded By: Anna Hanley; 2/13/2014 11:25:20 AM     Family History  Mother    · Family history of cerebrovascular accident (CVA) (V17.1) (Z82.3)   · Family history of diabetes mellitus (V18.0) (Z83.3)  Father    · Family history of diabetes mellitus (V18.0) (Z83.3)     Social History  Problems    · Consumes alcohol occasionally (V49.89) (Z78.9)   · Never a smoker   · No illicit drug use     Review of Systems   Cardiovascular:  Positive for dyspnea on exertion and leg swelling.   Musculoskeletal:  Positive for myalgias.   All other systems reviewed and are negative.      Current Outpatient Medications   Medication Sig Dispense  "Refill    ascorbic acid (Vitamin C) 1,000 mg tablet Take 1 tablet (1,000 mg) by mouth once daily.      aspirin 81 mg chewable tablet 1 tablet (81 mg).      atorvastatin (Lipitor) 20 mg tablet Take 1 tablet (20 mg) by mouth once daily.      BD Ultra-Fine Short Pen Needle 31 gauge x 5/16\" needle INJECT 1 EACH SUBCUTANEOUSLY EVERY 24 HR. GIVE WITH EACH INSULIN ADMINISTRATION.      cholecalciferol (Vitamin D-3) 50 mcg (2,000 unit) capsule Take 1 capsule (50 mcg) by mouth once daily. 90 capsule 3    cyanocobalamin (Vitamin B-12) 1,000 mcg tablet       dapagliflozin propanediol (Farxiga) 10 mg Take 1 tablet (10 mg) by mouth once daily.      DOCOSAHEXAENOIC ACID ORAL Take 1 capsule by mouth once daily.      doxepin (SINEquan) 50 mg capsule Take 1 capsule (50 mg) by mouth once daily at bedtime.      Easy Touch Alcohol Prep Pads pads, medicated USE TO CLEAN SKIN PRIOR TO CHECKING BLOOD SUGAR AND INJECTING INSULIN      famotidine (Pepcid) 20 mg tablet TAKE 1 TABLET BY MOUTH 2 TIMES DAILY. TAKE IT 30 MINUTES BEFORE FOOD (Patient taking differently: As needed)      fish oil concentrate (Omega-3) 120-180 mg capsule Take by mouth.      FreeStyle Lite Strips strip USE 1 STRIP 3 times daily      HYDROcodone-acetaminophen (Norco) 5-325 mg tablet Take 1 tablet by mouth every 6 hours if needed.      Lantus Solostar U-100 Insulin 100 unit/mL (3 mL) pen INJECT 82 UNITS UNDER THE SKIN AT BEDTIME.      lisinopriL-hydrochlorothiazide 20-12.5 mg tablet Take 1 tablet by mouth 2 times a day. 180 tablet 3    magnesium oxide (Mag-Ox) 200 mg magnesium tablet Take 1 tablet (200 mg) by mouth once daily. 90 tablet 3    meloxicam (Mobic) 15 mg tablet TAKE 1 TABLET BY MOUTH DAILY. TAKE IT WITH FOOD      metFORMIN (Glucophage) 1,000 mg tablet Take 1 tablet (1,000 mg) by mouth twice a day.      metoprolol succinate XL (Toprol-XL) 25 mg 24 hr tablet Take 1 tablet (25 mg) by mouth once daily.      nitroglycerin (Nitrostat) 0.4 mg SL tablet Place under " "the tongue every 5 minutes if needed.      omeprazole (PriLOSEC) 40 mg DR capsule Take 1 capsule (40 mg) by mouth 2 times a day.      semaglutide 0.25 mg or 0.5 mg (2 mg/3 mL) pen injector Inject 0.25 mg under the skin.      vitamins A,C,E-zinc-copper (PreserVision AREDS) 2,148 mcg-113 mg-45 mg-17.4mg tablet Take 1 tablet by mouth once daily.      hydrOXYzine HCL (Atarax) 25 mg tablet Take 1 tablet (25 mg) by mouth every 6 hours for 3 days. 12 tablet 0     No current facility-administered medications for this visit.        Visit Vitals  /80 (BP Location: Left arm)   Pulse 105   Ht 1.753 m (5' 9\")   Wt 113 kg (250 lb)   SpO2 93%   BMI 36.92 kg/m²   Smoking Status Former   BSA 2.35 m²        Objective     Constitutional:       Appearance: Not in distress.   Neck:      Vascular: JVD normal.   Pulmonary:      Breath sounds: Normal breath sounds.   Cardiovascular:      Normal rate. Regular rhythm. S1 with normal intensity. S2 with normal intensity.       Murmurs: There is no murmur.      No gallop.    Pulses:     Intact distal pulses.   Edema:     Peripheral edema absent.   Abdominal:      General: Bowel sounds are normal.   Neurological:      Mental Status: Alert and oriented to person, place and time.         Lab Review:   Lab Results   Component Value Date     (L) 12/16/2024    K 4.1 12/16/2024     12/16/2024    CO2 25 12/16/2024    BUN 23 12/16/2024    CREATININE 0.83 12/16/2024    GLUCOSE 103 (H) 12/16/2024    CALCIUM 9.6 12/16/2024         Assessment:    1.  Coronary artery disease.  Status post angioplasty and stenting of the circumflex marginal branch.  Atypical chest pain symptoms.  Relieved with Pepcid.  Lasts for hours.  On his presentation in December and his workup for any cardiac issues was negative.    2.  Hypertension.  Blood pressures are controlled.    3.  Hyperlipidemia.  Latest LDL is 87 but he had been off of his lipid therapy.  On therapy his LDL is 70.    4.  Bradycardia.  His " heart rate is about 70 on today's visit.    5.  Leg pains.  This problem has resolved.  Has good pedal pulses.  Mild venous insufficiency.

## 2025-01-23 ENCOUNTER — OFFICE VISIT (OUTPATIENT)
Dept: CARDIOLOGY | Facility: CLINIC | Age: 70
End: 2025-01-23
Payer: COMMERCIAL

## 2025-01-23 ENCOUNTER — HOSPITAL ENCOUNTER (OUTPATIENT)
Dept: CARDIOLOGY | Facility: CLINIC | Age: 70
Discharge: HOME | End: 2025-01-23
Payer: COMMERCIAL

## 2025-01-23 VITALS
HEART RATE: 95 BPM | DIASTOLIC BLOOD PRESSURE: 74 MMHG | HEIGHT: 70 IN | SYSTOLIC BLOOD PRESSURE: 132 MMHG | OXYGEN SATURATION: 95 % | BODY MASS INDEX: 35.33 KG/M2 | WEIGHT: 246.8 LBS

## 2025-01-23 DIAGNOSIS — I49.3 MULTIPLE PREMATURE VENTRICULAR COMPLEXES: ICD-10-CM

## 2025-01-23 DIAGNOSIS — I49.3 MULTIPLE PREMATURE VENTRICULAR COMPLEXES: Primary | ICD-10-CM

## 2025-01-23 DIAGNOSIS — I10 ESSENTIAL HYPERTENSION: ICD-10-CM

## 2025-01-23 DIAGNOSIS — I25.10 CORONARY ARTERY DISEASE INVOLVING NATIVE CORONARY ARTERY OF NATIVE HEART WITHOUT ANGINA PECTORIS: ICD-10-CM

## 2025-01-23 DIAGNOSIS — R06.09 EXERTIONAL DYSPNEA: ICD-10-CM

## 2025-01-23 LAB — BODY SURFACE AREA: 2.35 M2

## 2025-01-23 PROCEDURE — 3008F BODY MASS INDEX DOCD: CPT | Performed by: PHYSICIAN ASSISTANT

## 2025-01-23 PROCEDURE — 99214 OFFICE O/P EST MOD 30 MIN: CPT | Performed by: PHYSICIAN ASSISTANT

## 2025-01-23 PROCEDURE — 93225 XTRNL ECG REC<48 HRS REC: CPT

## 2025-01-23 PROCEDURE — 1160F RVW MEDS BY RX/DR IN RCRD: CPT | Performed by: PHYSICIAN ASSISTANT

## 2025-01-23 PROCEDURE — 1159F MED LIST DOCD IN RCRD: CPT | Performed by: PHYSICIAN ASSISTANT

## 2025-01-23 PROCEDURE — 3078F DIAST BP <80 MM HG: CPT | Performed by: PHYSICIAN ASSISTANT

## 2025-01-23 PROCEDURE — 1036F TOBACCO NON-USER: CPT | Performed by: PHYSICIAN ASSISTANT

## 2025-01-23 PROCEDURE — 3075F SYST BP GE 130 - 139MM HG: CPT | Performed by: PHYSICIAN ASSISTANT

## 2025-01-23 RX ORDER — ALBUTEROL SULFATE 90 UG/1
2 INHALANT RESPIRATORY (INHALATION) AS NEEDED
COMMUNITY

## 2025-01-23 RX ORDER — ALBUTEROL SULFATE 0.83 MG/ML
SOLUTION RESPIRATORY (INHALATION)
COMMUNITY
Start: 2025-01-18

## 2025-01-23 NOTE — PROGRESS NOTES
"Chief Complaint:   Hospital Follow-up (Bradycardia, signed himself out due to the wait)     History Of Present Illness:    Garth Sneed is a 69 y.o. male presenting after recent presentation to ED at [facility] for bradycardic pulse rates per his home BP monitor.  Patient was essentially asymptomatic when he noted HR's as low as 40's bpm as indicated on his home BP cuff.  Patient presented to ED, however eventually left AMA due to an extended duration prior to being evaluated.  No lightheadedness, dizziness, presyncope nor syncopal episodes.  +Documented history of frequent PVC's and ventricular bigeminy which may have contributed to falsely low HR on his device.  Patient denies chest pain, chest pressure, palpitations, dyspnea on exertion, shortness of breath at rest, diaphoresis, nausea/vomiting, back pain, headache, lightheadedness, dizziness, syncope or presyncopal episodes, active bleeding signs or symptoms, excessive weight gain, muscle or joint pain, claudication.       Last Recorded Vitals:  Vitals:    01/23/25 0839   BP: 132/74   BP Location: Left arm   Patient Position: Sitting   BP Cuff Size: Large adult   Pulse: 95   SpO2: 95%   Weight: 112 kg (246 lb 12.8 oz)   Height: 1.778 m (5' 10\")       Past Medical History:  He has a past medical history of Abnormal findings on diagnostic imaging of other parts of digestive tract, Cervicalgia, Other cervical disc displacement, unspecified cervical region, Other specified postprocedural states, Personal history of other diseases of the circulatory system (12/28/2022), Personal history of other diseases of the circulatory system, Personal history of other diseases of the digestive system, Personal history of other diseases of the digestive system, Personal history of other diseases of the musculoskeletal system and connective tissue, Personal history of other diseases of the nervous system and sense organs, Personal history of other diseases of the respiratory " "system (03/14/2018), Personal history of other diseases of the respiratory system (08/23/2018), Personal history of other endocrine, nutritional and metabolic disease (03/19/2018), Personal history of other medical treatment, Personal history of other specified conditions, Radiculopathy, site unspecified, and Spondylosis, unspecified.    Past Surgical History:  He has a past surgical history that includes Other surgical history (02/14/2018) and CT angio head w and wo IV contrast (4/10/2019).      Social History:  He reports that he has quit smoking. His smoking use included cigarettes. He has never used smokeless tobacco. He reports that he does not currently use alcohol. He reports that he does not use drugs.    Family History:  No family history on file.     Allergies:  Dulaglutide and Azithromycin    Outpatient Medications:  Current Outpatient Medications   Medication Instructions    albuterol 2.5 mg /3 mL (0.083 %) nebulizer solution INHALE 3 ML BY MOUTH EVERY 4 HOURS AS NEEDED FOR WHEEZING X 30 DAYS    albuterol 90 mcg/actuation inhaler 2 puffs, As needed    ascorbic acid (Vitamin C) 1,000 mg tablet 1 tablet, Daily RT    aspirin 81 mg    atorvastatin (LIPITOR) 20 mg, Daily    BD Ultra-Fine Short Pen Needle 31 gauge x 5/16\" needle INJECT 1 EACH SUBCUTANEOUSLY EVERY 24 HR. GIVE WITH EACH INSULIN ADMINISTRATION.    cholecalciferol (VITAMIN D-3) 50 mcg, oral, Daily RT    cyanocobalamin (Vitamin B-12) 1,000 mcg tablet     dapagliflozin propanediol (FARXIGA) 10 mg, Daily RT    DOCOSAHEXAENOIC ACID ORAL 1 capsule, Daily RT    doxepin (SINEquan) 50 mg capsule 1 capsule, Nightly    Easy Touch Alcohol Prep Pads pads, medicated USE TO CLEAN SKIN PRIOR TO CHECKING BLOOD SUGAR AND INJECTING INSULIN    famotidine (Pepcid) 20 mg tablet TAKE 1 TABLET BY MOUTH 2 TIMES DAILY. TAKE IT 30 MINUTES BEFORE FOOD    fish oil concentrate (Omega-3) 120-180 mg capsule Take by mouth.    FreeStyle Lite Strips strip USE 1 STRIP 3 times daily "    HYDROcodone-acetaminophen (Norco) 5-325 mg tablet 1 tablet, Every 6 hours PRN    hydrOXYzine HCL (ATARAX) 25 mg, oral, Every 6 hours    Lantus Solostar U-100 Insulin 100 unit/mL (3 mL) pen INJECT 82 UNITS UNDER THE SKIN AT BEDTIME.    lisinopriL-hydrochlorothiazide 20-12.5 mg tablet 1 tablet, oral, 2 times daily    magnesium oxide (MAG-OX) 200 mg, oral, Daily    meloxicam (Mobic) 15 mg tablet TAKE 1 TABLET BY MOUTH DAILY. TAKE IT WITH FOOD    metFORMIN (GLUCOPHAGE) 1,000 mg, 2 times daily    metoprolol succinate XL (TOPROL-XL) 25 mg, Daily RT    nitroglycerin (Nitrostat) 0.4 mg SL tablet Every 5 min PRN    omeprazole (PRILOSEC) 40 mg, 2 times daily    vitamins A,C,E-zinc-copper (PreserVision AREDS) 2,148 mcg-113 mg-45 mg-17.4mg tablet 1 tablet, Daily RT       Physical Exam:  Constitutional: awake and alert, oriented ×3, no apparent distress  Skin: warm, dry, good turgor no obvious lesions  Eyes: pupils equal, round, reactive to light, conjunctiva pink and noninjected, no discharge  HENT: normocephalic and atraumatic, mucous membranes moist, trachea midline with no masses/goiter  Cardiovascular: S1/S2 regular, no murmur no rubs/gallops, no carotid bruits, no JVD  Pulmonary: symmetrical chest expansion, lungs are clear to auscultation bilaterally, no wheezes/rales/rhonchi, normal effort  Abdomen: nontender, nondistended, active bowel sounds, no ascites  Extremities: no cyanosis, clubbing, no LE edema no lesions; palpable pedal pulses  Neurologic: cranial nerves II - XII grossly intact, stable gait, no tremor       Last Labs:  CBC -  Lab Results   Component Value Date    WBC 8.2 12/16/2024    HGB 14.3 12/16/2024    HCT 43.0 12/16/2024    MCV 90 12/16/2024     12/16/2024       CMP -  Lab Results   Component Value Date    CALCIUM 9.6 12/16/2024    PHOS 3.8 05/01/2023    PROT 7.4 12/16/2024    ALBUMIN 4.7 12/16/2024    AST 32 12/16/2024    ALT 36 12/16/2024    ALKPHOS 59 12/16/2024    BILITOT 0.5 12/16/2024  "      LIPID PANEL -   Lab Results   Component Value Date    CHOL 134 05/01/2023    TRIG 232 (H) 05/01/2023    HDL 35.9 (A) 05/01/2023    CHHDL 3.7 05/01/2023    LDLF 52 05/01/2023    VLDL 46 (H) 05/01/2023    NHDL 98 05/01/2023       RENAL FUNCTION PANEL -   Lab Results   Component Value Date    GLUCOSE 103 (H) 12/16/2024     (L) 12/16/2024    K 4.1 12/16/2024     12/16/2024    CO2 25 12/16/2024    ANIONGAP 11 12/16/2024    BUN 23 12/16/2024    CREATININE 0.83 12/16/2024    GFRMALE >90 05/29/2023    CALCIUM 9.6 12/16/2024    PHOS 3.8 05/01/2023    ALBUMIN 4.7 12/16/2024        Lab Results   Component Value Date    BNP 12 05/07/2024    HGBA1C 7.9 (H) 11/13/2024       Last Cardiology Tests:  ECG:  ECG 12 lead 12/16/2024      Echo:  No results found for this or any previous visit from the past 1095 days.      Ejection Fractions:  No results found for: \"EF\"    Cath:  No results found for this or any previous visit from the past 1095 days.      Stress Test:  No results found for this or any previous visit from the past 1095 days.      Cardiac Imaging:  No results found for this or any previous visit from the past 1095 days.      Assessment/Plan   Problem List Items Addressed This Visit             ICD-10-CM       Cardiac and Vasculature    Coronary artery disease I25.10    Essential hypertension I10    Exertional dyspnea R06.09    Multiple premature ventricular complexes - Primary I49.3    Relevant Orders    Holter or Event Cardiac Monitor       -As discussed in HPI    -We will place a Holter monitor for further arrhythmic evaluation    -F/U in clinic as scheduled    Iban Ho PA-C  "

## 2025-01-27 LAB — BODY SURFACE AREA: 2.35 M2

## 2025-07-07 ENCOUNTER — APPOINTMENT (OUTPATIENT)
Dept: CARDIOLOGY | Facility: CLINIC | Age: 70
End: 2025-07-07
Payer: COMMERCIAL

## 2025-07-07 NOTE — PROGRESS NOTES
"Subjective   Garth Sneed is a 70 y.o. male.    Chief Complaint:  No chief complaint on file.    HPI    He was seen in the emergency room in December 2022 with palpitations and elevated blood pressures. His heart rate was 65. His EKG was according to the notes was unremarkable his laboratory data was normal and he was subsequently discharged for outpatient follow-up.      His most recent cardiac catheterization was done in May 2017. He had a patent stent in the circumflex marginal branch, mild disease in the anterior descending and mild disease in the large dominant right coronary artery. His left ventricular ejection fraction is normal.     Other medical problems, hypertension hyperlipidemia. He has history of degenerative joint disease. Has a history of diabetes mellitus which at times has been difficult to control.     Allergies  Medication    · No Known Drug Allergies      Family History  Mother    · Family history of cerebrovascular accident (CVA) (V17.1) (Z82.3)   · Family history of diabetes mellitus (V18.0) (Z83.3)  Father    · Family history of diabetes mellitus (V18.0) (Z83.3)     Social History  Problems    · Consumes alcohol occasionally (V49.89) (Z78.9)   · Never a smoker   · No illicit drug use     Review of Systems   Cardiovascular:  Positive for dyspnea on exertion and leg swelling.   Musculoskeletal:  Positive for myalgias.   All other systems reviewed and are negative.    ROS    Current Medications[1]     Visit Vitals  Smoking Status Former        Objective     Physical Exam    Lab Review:   {Recent labs:17677::\"not applicable\"}    Assessment:    1.       [1]   Current Outpatient Medications   Medication Sig Dispense Refill    albuterol 2.5 mg /3 mL (0.083 %) nebulizer solution INHALE 3 ML BY MOUTH EVERY 4 HOURS AS NEEDED FOR WHEEZING X 30 DAYS      albuterol 90 mcg/actuation inhaler Inhale 2 puffs if needed for shortness of breath.      ascorbic acid (Vitamin C) 1,000 mg tablet Take 1 " "tablet (1,000 mg) by mouth once daily.      aspirin 81 mg chewable tablet 1 tablet (81 mg).      atorvastatin (Lipitor) 20 mg tablet Take 1 tablet (20 mg) by mouth once daily.      BD Ultra-Fine Short Pen Needle 31 gauge x 5/16\" needle INJECT 1 EACH SUBCUTANEOUSLY EVERY 24 HR. GIVE WITH EACH INSULIN ADMINISTRATION.      cyanocobalamin (Vitamin B-12) 1,000 mcg tablet       dapagliflozin propanediol (Farxiga) 10 mg Take 1 tablet (10 mg) by mouth once daily.      DOCOSAHEXAENOIC ACID ORAL Take 1 capsule by mouth once daily.      doxepin (SINEquan) 50 mg capsule Take 1 capsule (50 mg) by mouth once daily at bedtime.      Easy Touch Alcohol Prep Pads pads, medicated USE TO CLEAN SKIN PRIOR TO CHECKING BLOOD SUGAR AND INJECTING INSULIN      famotidine (Pepcid) 20 mg tablet TAKE 1 TABLET BY MOUTH 2 TIMES DAILY. TAKE IT 30 MINUTES BEFORE FOOD      fish oil concentrate (Omega-3) 120-180 mg capsule Take by mouth.      FreeStyle Lite Strips strip USE 1 STRIP 3 times daily      HYDROcodone-acetaminophen (Norco) 5-325 mg tablet Take 1 tablet by mouth every 6 hours if needed.      hydrOXYzine HCL (Atarax) 25 mg tablet Take 1 tablet (25 mg) by mouth every 6 hours for 3 days. 12 tablet 0    Lantus Solostar U-100 Insulin 100 unit/mL (3 mL) pen INJECT 82 UNITS UNDER THE SKIN AT BEDTIME.      lisinopriL-hydrochlorothiazide 20-12.5 mg tablet Take 1 tablet by mouth 2 times a day. 180 tablet 3    meloxicam (Mobic) 15 mg tablet TAKE 1 TABLET BY MOUTH DAILY. TAKE IT WITH FOOD      metFORMIN (Glucophage) 1,000 mg tablet Take 1 tablet (1,000 mg) by mouth twice a day.      metoprolol succinate XL (Toprol-XL) 25 mg 24 hr tablet Take 1 tablet (25 mg) by mouth once daily.      nitroglycerin (Nitrostat) 0.4 mg SL tablet Place under the tongue every 5 minutes if needed.      omeprazole (PriLOSEC) 40 mg DR capsule Take 1 capsule (40 mg) by mouth 2 times a day.      vitamins A,C,E-zinc-copper (PreserVision AREDS) 2,148 mcg-113 mg-45 mg-17.4mg tablet " Take 1 tablet by mouth once daily.       No current facility-administered medications for this visit.

## 2025-07-11 ENCOUNTER — HOSPITAL ENCOUNTER (OUTPATIENT)
Dept: CARDIOLOGY | Facility: CLINIC | Age: 70
Discharge: HOME | End: 2025-07-11
Payer: COMMERCIAL

## 2025-07-11 ENCOUNTER — OFFICE VISIT (OUTPATIENT)
Dept: CARDIOLOGY | Facility: CLINIC | Age: 70
End: 2025-07-11
Payer: COMMERCIAL

## 2025-07-11 VITALS
SYSTOLIC BLOOD PRESSURE: 136 MMHG | HEIGHT: 70 IN | HEART RATE: 94 BPM | WEIGHT: 250 LBS | BODY MASS INDEX: 35.79 KG/M2 | OXYGEN SATURATION: 96 % | DIASTOLIC BLOOD PRESSURE: 84 MMHG

## 2025-07-11 VITALS
DIASTOLIC BLOOD PRESSURE: 74 MMHG | HEIGHT: 70 IN | WEIGHT: 246 LBS | SYSTOLIC BLOOD PRESSURE: 132 MMHG | BODY MASS INDEX: 35.22 KG/M2

## 2025-07-11 DIAGNOSIS — R06.09 EXERTIONAL DYSPNEA: ICD-10-CM

## 2025-07-11 DIAGNOSIS — R00.2 PALPITATIONS: ICD-10-CM

## 2025-07-11 DIAGNOSIS — I25.10 CORONARY ARTERY DISEASE INVOLVING NATIVE CORONARY ARTERY OF NATIVE HEART WITHOUT ANGINA PECTORIS: ICD-10-CM

## 2025-07-11 DIAGNOSIS — I49.3 MULTIPLE PREMATURE VENTRICULAR COMPLEXES: ICD-10-CM

## 2025-07-11 DIAGNOSIS — R06.00 DYSPNEA, UNSPECIFIED: ICD-10-CM

## 2025-07-11 DIAGNOSIS — I87.2 VENOUS INSUFFICIENCY: Primary | ICD-10-CM

## 2025-07-11 DIAGNOSIS — R94.31 ABNORMAL ELECTROCARDIOGRAM (ECG) (EKG): ICD-10-CM

## 2025-07-11 DIAGNOSIS — E78.5 HYPERLIPIDEMIA, UNSPECIFIED HYPERLIPIDEMIA TYPE: ICD-10-CM

## 2025-07-11 DIAGNOSIS — I10 ESSENTIAL HYPERTENSION: ICD-10-CM

## 2025-07-11 DIAGNOSIS — Z95.5 STATUS POST CORONARY ARTERY STENT PLACEMENT: ICD-10-CM

## 2025-07-11 PROBLEM — Z98.890 HISTORY OF CARDIAC CATHETERIZATION: Status: RESOLVED | Noted: 2024-09-26 | Resolved: 2025-07-11

## 2025-07-11 LAB
AORTIC VALVE MEAN GRADIENT: 4 MMHG
AORTIC VALVE PEAK VELOCITY: 1.36 M/S
AV PEAK GRADIENT: 7 MMHG
AVA (PEAK VEL): 3.06 CM2
AVA (VTI): 3.2 CM2
BODY SURFACE AREA: 2.37 M2
EJECTION FRACTION APICAL 4 CHAMBER: 55.4
EJECTION FRACTION: 60 %
LEFT ATRIUM VOLUME AREA LENGTH INDEX BSA: 20.9 ML/M2
LEFT VENTRICLE INTERNAL DIMENSION DIASTOLE: 3.63 CM (ref 3.5–6)
LEFT VENTRICULAR OUTFLOW TRACT DIAMETER: 2.17 CM
MITRAL VALVE E/A RATIO: 0.75
RIGHT VENTRICLE FREE WALL PEAK S': 1.3 CM/S
RIGHT VENTRICLE PEAK SYSTOLIC PRESSURE: 29 MMHG
TRICUSPID ANNULAR PLANE SYSTOLIC EXCURSION: 2.4 CM

## 2025-07-11 PROCEDURE — 3079F DIAST BP 80-89 MM HG: CPT | Performed by: INTERNAL MEDICINE

## 2025-07-11 PROCEDURE — 1159F MED LIST DOCD IN RCRD: CPT | Performed by: INTERNAL MEDICINE

## 2025-07-11 PROCEDURE — 3008F BODY MASS INDEX DOCD: CPT | Performed by: INTERNAL MEDICINE

## 2025-07-11 PROCEDURE — 99213 OFFICE O/P EST LOW 20 MIN: CPT | Performed by: INTERNAL MEDICINE

## 2025-07-11 PROCEDURE — 93306 TTE W/DOPPLER COMPLETE: CPT | Performed by: INTERNAL MEDICINE

## 2025-07-11 PROCEDURE — 93306 TTE W/DOPPLER COMPLETE: CPT

## 2025-07-11 PROCEDURE — 3075F SYST BP GE 130 - 139MM HG: CPT | Performed by: INTERNAL MEDICINE

## 2025-07-11 PROCEDURE — 1036F TOBACCO NON-USER: CPT | Performed by: INTERNAL MEDICINE

## 2025-07-11 PROCEDURE — 99212 OFFICE O/P EST SF 10 MIN: CPT | Mod: 25

## 2025-07-11 RX ORDER — OXYBUTYNIN CHLORIDE 5 MG/1
5 TABLET, EXTENDED RELEASE ORAL DAILY
COMMUNITY

## 2025-07-11 RX ORDER — LANOLIN ALCOHOL/MO/W.PET/CERES
1 CREAM (GRAM) TOPICAL
COMMUNITY
Start: 2025-06-30 | End: 2025-07-11 | Stop reason: SDUPTHER

## 2025-07-11 RX ORDER — LISINOPRIL AND HYDROCHLOROTHIAZIDE 12.5; 2 MG/1; MG/1
1 TABLET ORAL 2 TIMES DAILY
Qty: 180 TABLET | Refills: 3 | Status: SHIPPED | OUTPATIENT
Start: 2025-07-11 | End: 2026-07-11

## 2025-07-11 RX ORDER — LANOLIN ALCOHOL/MO/W.PET/CERES
1 CREAM (GRAM) TOPICAL
Qty: 90 TABLET | Refills: 3 | Status: SHIPPED | OUTPATIENT
Start: 2025-07-11 | End: 2026-07-11

## 2025-07-11 RX ORDER — SEMAGLUTIDE 2.68 MG/ML
2 INJECTION, SOLUTION SUBCUTANEOUS
COMMUNITY
Start: 2025-05-07

## 2025-07-11 RX ORDER — TIZANIDINE 2 MG/1
TABLET ORAL
COMMUNITY
Start: 2025-06-24

## 2025-07-11 RX ORDER — ATORVASTATIN CALCIUM 20 MG/1
20 TABLET, FILM COATED ORAL DAILY
Qty: 90 TABLET | Refills: 3 | Status: SHIPPED | OUTPATIENT
Start: 2025-07-11 | End: 2026-07-11

## 2025-07-11 RX ORDER — METOPROLOL SUCCINATE 25 MG/1
25 TABLET, EXTENDED RELEASE ORAL
Qty: 90 TABLET | Refills: 3 | Status: SHIPPED | OUTPATIENT
Start: 2025-07-11 | End: 2026-07-11

## 2025-07-11 RX ORDER — ACETAMINOPHEN 500 MG
1 TABLET ORAL
COMMUNITY
Start: 2025-05-07

## 2025-07-11 RX ORDER — LISINOPRIL 20 MG/1
20 TABLET ORAL DAILY
COMMUNITY
Start: 2025-06-03 | End: 2025-07-11 | Stop reason: WASHOUT

## 2025-07-11 NOTE — PROGRESS NOTES
"Subjective   Garth Sneed is a 70 y.o. male.    Chief Complaint:  Follow-up coronary artery disease, hypertension, hyperlipidemia.    HPI    From a cardiac standpoint he has remained asymptomatic.  No chest discomfort.  Did have an evaluation for premature beats.  At one point was noted to have bradycardia.  Complains of constipation and abdominal bloating.  No further episodes of chest pain across the anterior chest.  In December 2024 his son was murdered.  Reports normal blood pressures at home.    He was seen in the emergency room in December 2022 with palpitations and elevated blood pressures. His heart rate was 65. His EKG was according to the notes was unremarkable his laboratory data was normal and he was subsequently discharged for outpatient follow-up.      His most recent cardiac catheterization was done in May 2017. He had a patent stent in the circumflex marginal branch, mild disease in the anterior descending and mild disease in the large dominant right coronary artery. His left ventricular ejection fraction is normal.     Other medical problems, hypertension hyperlipidemia. He has history of degenerative joint disease. Has a history of diabetes mellitus which at times has been difficult to control.     Allergies  Medication    · No Known Drug Allergies     Family History  Mother    · Family history of cerebrovascular accident (CVA) (V17.1) (Z82.3)   · Family history of diabetes mellitus (V18.0) (Z83.3)  Father    · Family history of diabetes mellitus (V18.0) (Z83.3)     Social History  Problems    · Consumes alcohol occasionally (V49.89) (Z78.9)   · Never a smoker   · No illicit drug use     Review of Systems   Cardiovascular:  Positive for dyspnea on exertion and leg swelling.   Musculoskeletal:  Positive for myalgias.   All other systems reviewed and are negative.      Current Medications[1]     Visit Vitals  /84 (BP Location: Left arm)   Pulse 94   Ht 1.778 m (5' 10\")   Wt 113 kg (250 " lb)   SpO2 96%   BMI 35.87 kg/m²   Smoking Status Former   BSA 2.36 m²        Objective     Constitutional:       Appearance: Not in distress.   Neck:      Vascular: JVD normal.   Pulmonary:      Breath sounds: Normal breath sounds.   Cardiovascular:      Normal rate. Regular rhythm. S1 with normal intensity. S2 with normal intensity.       Murmurs: There is no murmur.      No gallop.    Pulses:     Intact distal pulses.   Edema:     Peripheral edema absent.   Abdominal:      General: Bowel sounds are normal.   Neurological:      Mental Status: Alert and oriented to person, place and time.         Lab Review:   Lab Results   Component Value Date     (L) 12/16/2024    K 4.1 12/16/2024     12/16/2024    CO2 25 12/16/2024    BUN 23 12/16/2024    CREATININE 0.83 12/16/2024    GLUCOSE 103 (H) 12/16/2024    CALCIUM 9.6 12/16/2024     Lab Results   Component Value Date    CHOL 134 05/01/2023    TRIG 232 (H) 05/01/2023    HDL 35.9 (A) 05/01/2023       Assessment:    1.  Coronary artery disease.  No anginal symptoms.  Today's echo study shows normal left ventricular function with no wall motion abnormalities.  Will continue medical management.    2.  Hypertension.  Good blood pressures at home.    3.  Hyperlipidemia.  Will be seeing his primary doctor soon and will get blood work.    4.  Bradycardia.  Patient's Holter monitor demonstrated an average heart rate of 81 with a peak artery of 127 and a minimal heart rate of 59.  Occasional PVCs.  Bradycardia is likely secondary to PVCs which then results in an improper heart rate reading.    5.  Leg pains.  Improved with magnesium.  Good pedal pulses.         [1]   Current Outpatient Medications   Medication Sig Dispense Refill    albuterol 2.5 mg /3 mL (0.083 %) nebulizer solution INHALE 3 ML BY MOUTH EVERY 4 HOURS AS NEEDED FOR WHEEZING X 30 DAYS      albuterol 90 mcg/actuation inhaler Inhale 2 puffs if needed for shortness of breath.      ascorbic acid (Vitamin C)  "1,000 mg tablet Take 1 tablet (1,000 mg) by mouth once daily.      aspirin 81 mg chewable tablet 1 tablet (81 mg).      BD Ultra-Fine Short Pen Needle 31 gauge x 5/16\" needle INJECT 1 EACH SUBCUTANEOUSLY EVERY 24 HR. GIVE WITH EACH INSULIN ADMINISTRATION.      cholecalciferol (Vitamin D-3) 50 mcg (2,000 units) capsule Take 1 capsule (50 mcg) by mouth early in the morning..      cyanocobalamin (Vitamin B-12) 1,000 mcg tablet       dapagliflozin propanediol (Farxiga) 10 mg Take 1 tablet (10 mg) by mouth once daily.      DOCOSAHEXAENOIC ACID ORAL Take 1 capsule by mouth once daily.      doxepin (SINEquan) 50 mg capsule Take 1 capsule (50 mg) by mouth once daily at bedtime.      Easy Touch Alcohol Prep Pads pads, medicated USE TO CLEAN SKIN PRIOR TO CHECKING BLOOD SUGAR AND INJECTING INSULIN      famotidine (Pepcid) 20 mg tablet TAKE 1 TABLET BY MOUTH 2 TIMES DAILY. TAKE IT 30 MINUTES BEFORE FOOD      fish oil concentrate (Omega-3) 120-180 mg capsule Take by mouth.      FreeStyle Lite Strips strip USE 1 STRIP 3 times daily      HYDROcodone-acetaminophen (Norco) 5-325 mg tablet Take 1 tablet by mouth every 6 hours if needed.      Lantus Solostar U-100 Insulin 100 unit/mL (3 mL) pen INJECT 82 UNITS UNDER THE SKIN AT BEDTIME.      meloxicam (Mobic) 15 mg tablet TAKE 1 TABLET BY MOUTH DAILY. TAKE IT WITH FOOD      metFORMIN (Glucophage) 1,000 mg tablet Take 1 tablet (1,000 mg) by mouth twice a day.      nitroglycerin (Nitrostat) 0.4 mg SL tablet Place under the tongue every 5 minutes if needed.      omeprazole (PriLOSEC) 40 mg DR capsule Take 1 capsule (40 mg) by mouth 2 times a day.      oxyBUTYnin XL (Ditropan-XL) 5 mg 24 hr tablet Take 1 tablet (5 mg) by mouth once daily. (Patient taking differently: Take 1 tablet (5 mg) by mouth once daily. As needed)      Ozempic 2 mg/dose (8 mg/3 mL) pen injector Inject 2 mg under the skin 1 (one) time per week.      tiZANidine (Zanaflex) 2 mg tablet TAKE 1 TABLET BY MOUTH AT BEDTIME " AS NEEDED. TAKE IT FOR NECK PAIN .MAY CAUSE DROWSINESS      vitamins A,C,E-zinc-copper (PreserVision AREDS) 2,148 mcg-113 mg-45 mg-17.4mg tablet Take 1 tablet by mouth once daily.      atorvastatin (Lipitor) 20 mg tablet Take 1 tablet (20 mg) by mouth once daily. 90 tablet 3    hydrOXYzine HCL (Atarax) 25 mg tablet Take 1 tablet (25 mg) by mouth every 6 hours for 3 days. 12 tablet 0    lisinopriL-hydrochlorothiazide 20-12.5 mg tablet Take 1 tablet by mouth 2 times a day. 180 tablet 3    magnesium oxide (Mag-Ox) 400 mg (241.3 mg elemental) tablet Take 1 tablet by mouth early in the morning.. 90 tablet 3    metoprolol succinate XL (Toprol-XL) 25 mg 24 hr tablet Take 1 tablet (25 mg) by mouth once daily. 90 tablet 3     No current facility-administered medications for this visit.

## 2025-07-11 NOTE — PATIENT INSTRUCTIONS
No changes in medications.    See the Dr. Negrete group  (Dr. Pedro Negrete, Trini, Antonio, Morris, Kavitha)  [phone]

## 2025-07-27 ENCOUNTER — APPOINTMENT (OUTPATIENT)
Dept: CARDIOLOGY | Facility: HOSPITAL | Age: 70
End: 2025-07-27
Payer: COMMERCIAL

## 2025-07-27 ENCOUNTER — HOSPITAL ENCOUNTER (EMERGENCY)
Facility: HOSPITAL | Age: 70
Discharge: HOME | End: 2025-07-27
Payer: COMMERCIAL

## 2025-07-27 ENCOUNTER — APPOINTMENT (OUTPATIENT)
Dept: RADIOLOGY | Facility: HOSPITAL | Age: 70
End: 2025-07-27
Payer: COMMERCIAL

## 2025-07-27 VITALS
RESPIRATION RATE: 17 BRPM | BODY MASS INDEX: 35.07 KG/M2 | HEIGHT: 70 IN | SYSTOLIC BLOOD PRESSURE: 138 MMHG | OXYGEN SATURATION: 96 % | TEMPERATURE: 97.7 F | WEIGHT: 245 LBS | HEART RATE: 66 BPM | DIASTOLIC BLOOD PRESSURE: 60 MMHG

## 2025-07-27 DIAGNOSIS — R42 VERTIGO: Primary | ICD-10-CM

## 2025-07-27 LAB
ALBUMIN SERPL BCP-MCNC: 4.6 G/DL (ref 3.4–5)
ALP SERPL-CCNC: 61 U/L (ref 33–136)
ALT SERPL W P-5'-P-CCNC: 29 U/L (ref 10–52)
ANION GAP SERPL CALC-SCNC: 12 MMOL/L (ref 10–20)
AST SERPL W P-5'-P-CCNC: 28 U/L (ref 9–39)
BASOPHILS # BLD AUTO: 0.02 X10*3/UL (ref 0–0.1)
BASOPHILS NFR BLD AUTO: 0.3 %
BILIRUB SERPL-MCNC: 0.7 MG/DL (ref 0–1.2)
BUN SERPL-MCNC: 23 MG/DL (ref 6–23)
CALCIUM SERPL-MCNC: 9.7 MG/DL (ref 8.6–10.3)
CARDIAC TROPONIN I PNL SERPL HS: 6 NG/L (ref 0–20)
CARDIAC TROPONIN I PNL SERPL HS: 7 NG/L (ref 0–20)
CHLORIDE SERPL-SCNC: 103 MMOL/L (ref 98–107)
CO2 SERPL-SCNC: 26 MMOL/L (ref 21–32)
CREAT SERPL-MCNC: 0.99 MG/DL (ref 0.5–1.3)
EGFRCR SERPLBLD CKD-EPI 2021: 82 ML/MIN/1.73M*2
EOSINOPHIL # BLD AUTO: 0.09 X10*3/UL (ref 0–0.7)
EOSINOPHIL NFR BLD AUTO: 1.1 %
ERYTHROCYTE [DISTWIDTH] IN BLOOD BY AUTOMATED COUNT: 13.5 % (ref 11.5–14.5)
GLUCOSE BLD MANUAL STRIP-MCNC: 90 MG/DL (ref 74–99)
GLUCOSE SERPL-MCNC: 145 MG/DL (ref 74–99)
HCT VFR BLD AUTO: 43.7 % (ref 41–52)
HGB BLD-MCNC: 14.4 G/DL (ref 13.5–17.5)
IMM GRANULOCYTES # BLD AUTO: 0.04 X10*3/UL (ref 0–0.7)
IMM GRANULOCYTES NFR BLD AUTO: 0.5 % (ref 0–0.9)
LYMPHOCYTES # BLD AUTO: 1.28 X10*3/UL (ref 1.2–4.8)
LYMPHOCYTES NFR BLD AUTO: 16.1 %
MAGNESIUM SERPL-MCNC: 1.96 MG/DL (ref 1.6–2.4)
MCH RBC QN AUTO: 29.9 PG (ref 26–34)
MCHC RBC AUTO-ENTMCNC: 33 G/DL (ref 32–36)
MCV RBC AUTO: 91 FL (ref 80–100)
MONOCYTES # BLD AUTO: 0.51 X10*3/UL (ref 0.1–1)
MONOCYTES NFR BLD AUTO: 6.4 %
NEUTROPHILS # BLD AUTO: 6.02 X10*3/UL (ref 1.2–7.7)
NEUTROPHILS NFR BLD AUTO: 75.6 %
NRBC BLD-RTO: 0 /100 WBCS (ref 0–0)
PHOSPHATE SERPL-MCNC: 3.6 MG/DL (ref 2.5–4.9)
PLATELET # BLD AUTO: 190 X10*3/UL (ref 150–450)
POTASSIUM SERPL-SCNC: 4.2 MMOL/L (ref 3.5–5.3)
PROT SERPL-MCNC: 7.4 G/DL (ref 6.4–8.2)
RBC # BLD AUTO: 4.82 X10*6/UL (ref 4.5–5.9)
SODIUM SERPL-SCNC: 137 MMOL/L (ref 136–145)
WBC # BLD AUTO: 8 X10*3/UL (ref 4.4–11.3)

## 2025-07-27 PROCEDURE — 36415 COLL VENOUS BLD VENIPUNCTURE: CPT

## 2025-07-27 PROCEDURE — 84100 ASSAY OF PHOSPHORUS: CPT

## 2025-07-27 PROCEDURE — 85025 COMPLETE CBC W/AUTO DIFF WBC: CPT

## 2025-07-27 PROCEDURE — 70450 CT HEAD/BRAIN W/O DYE: CPT | Performed by: RADIOLOGY

## 2025-07-27 PROCEDURE — 2550000001 HC RX 255 CONTRASTS

## 2025-07-27 PROCEDURE — 70496 CT ANGIOGRAPHY HEAD: CPT

## 2025-07-27 PROCEDURE — 80053 COMPREHEN METABOLIC PANEL: CPT

## 2025-07-27 PROCEDURE — 70496 CT ANGIOGRAPHY HEAD: CPT | Performed by: RADIOLOGY

## 2025-07-27 PROCEDURE — 70498 CT ANGIOGRAPHY NECK: CPT | Performed by: RADIOLOGY

## 2025-07-27 PROCEDURE — 93005 ELECTROCARDIOGRAM TRACING: CPT

## 2025-07-27 PROCEDURE — 83735 ASSAY OF MAGNESIUM: CPT

## 2025-07-27 PROCEDURE — 82947 ASSAY GLUCOSE BLOOD QUANT: CPT | Mod: 59

## 2025-07-27 PROCEDURE — 70450 CT HEAD/BRAIN W/O DYE: CPT | Mod: 59

## 2025-07-27 PROCEDURE — 99285 EMERGENCY DEPT VISIT HI MDM: CPT | Mod: 25

## 2025-07-27 PROCEDURE — 2500000002 HC RX 250 W HCPCS SELF ADMINISTERED DRUGS (ALT 637 FOR MEDICARE OP, ALT 636 FOR OP/ED)

## 2025-07-27 PROCEDURE — 84484 ASSAY OF TROPONIN QUANT: CPT

## 2025-07-27 RX ORDER — MECLIZINE HYDROCHLORIDE 25 MG/1
25 TABLET ORAL 3 TIMES DAILY PRN
Qty: 21 TABLET | Refills: 0 | Status: SHIPPED | OUTPATIENT
Start: 2025-07-27 | End: 2025-08-06

## 2025-07-27 RX ORDER — MECLIZINE HYDROCHLORIDE 25 MG/1
25 TABLET ORAL ONCE
Status: COMPLETED | OUTPATIENT
Start: 2025-07-27 | End: 2025-07-27

## 2025-07-27 RX ADMIN — MECLIZINE HYDROCHLORIDE 25 MG: 25 TABLET ORAL at 15:42

## 2025-07-27 RX ADMIN — IOHEXOL 75 ML: 350 INJECTION, SOLUTION INTRAVENOUS at 17:40

## 2025-07-27 NOTE — DISCHARGE INSTRUCTIONS
Please return if you have worsening of symptoms passout severe headache or any other concerns.  Otherwise please call your primary care doctor regarding your visit today.

## 2025-07-27 NOTE — ED TRIAGE NOTES
Pt presents to department from home wt c/o SOB and dizziness since 2300 last evening. Pt states head feels full. Pt denies CP, N/V. Pt endorses cardiac hx. No acute distress noted, respirations are even and unlabored, VSS. PMHX DM2, HTN

## 2025-07-27 NOTE — ED PROVIDER NOTES
History of Present Illness     History provided by: Patient  Limitations to History: None  External Records Reviewed with Brief Summary: Previous ED visits and outpatient records for past medical history.    HPI:  Garth Sneed is a 70 y.o. male with history of GERD hypertension CAD hyperlipidemia former smoker diabetes presents for evaluation of acute onset dizziness that started at approximately 1130 last night.  It is worse with movement.  He is not having any vision changes.  He does endorse some changing in his hearing on the right.  He is not having any ringing in his ears he denies any numbness tingling or weakness.  He has no vomiting or nausea.  He states he has has vertigo in the past but it was a long time ago and he does not remember if this is what it felt like.  No falls traumas or syncopes.    Physical Exam   Triage vitals:  T 36.5 °C (97.7 °F)  HR 84  /75  RR 18  O2 95 % None (Room air)    General: Awake, alert, in no acute distress  Eyes: Gaze conjugate.  No scleral icterus or injection  HENT: Normo-cephalic, atraumatic. No stridor  CV: Regular rate, regular rhythm. Radial pulses 2+ bilaterally  Resp: Breathing non-labored, speaking in full sentences.  Clear to auscultation bilaterally  GI: Soft, non-distended, non-tender. No rebound or guarding.  MSK/Extremities: No gross bony deformities. Moving all extremities  Skin: Warm. Appropriate color  Neuro: Alert. Oriented. Face symmetric. Speech is fluent.  Gross strength and sensation intact in b/l UE and LEs  Psych: Appropriate mood and affect      Medical Decision Making & ED Course   Medical Decision Makin y.o. male  This patient presents with dizziness, most consistent with a peripheral cause, likely vertigo. Differential diagnoses includes: BPPV versus labrynthitis.I do have some concern for central vertigo though no gradual onset, vertical/bidirectional or nonfatigable nystagmus, focal neurologic findings on exam, though with  unsteadiness on feet will obtain CTA. Presentation not consistent with an acute CNS infection, vertebral basilar artery insufficiency, cerebellar hemorrhage or infarction, intracranial mass or bleed, temporal lobe epilepsy, multiple sclerosis, trauma, complex migraine headache. Other acute, emergent causes of vertigo are unlikely given at this time. CT imaging with no focal findings noted.   Plan: meclizine, supportive care, serial reassessment     On reeval, patient much improved, ambulating with steady gait. Was dc in stable condition.   --   Social Determinants of Health which Significantly Impact Care: None identified     EKG Independent Interpretation: EKG interpreted by myself. NSR with rate of 83bpm. Reg axis and intervals. No acute ischemic changes.     Independent Result Review and Interpretation: Results were independently reviewed and interpreted by myself. Please see ED course and MDM for full interpretation.    Chronic conditions affecting the patient's care: As documented in the MDM    The patient was discussed with the following consultants/services: None    Care Considerations: As per [facility]    ED Course:  Diagnoses as of 07/27/25 1949   Vertigo     Disposition   As a result of the work-up, the patient was discharged home.  he was informed of his diagnosis and instructed to come back with any concerns or worsening of condition.  he and was agreeable to the plan as discussed above.  he was given the opportunity to ask questions.  All of the patient's questions were answered.    Procedures   Procedures    Lynda Topete DO  Emergency Medicine     Lynda Topete DO  07/30/25 1906

## 2025-07-29 LAB
ATRIAL RATE: 83 BPM
P AXIS: 20 DEGREES
P OFFSET: 183 MS
P ONSET: 137 MS
PR INTERVAL: 170 MS
Q ONSET: 222 MS
QRS COUNT: 13 BEATS
QRS DURATION: 84 MS
QT INTERVAL: 384 MS
QTC CALCULATION(BAZETT): 451 MS
QTC FREDERICIA: 428 MS
R AXIS: -7 DEGREES
T AXIS: 71 DEGREES
T OFFSET: 414 MS
VENTRICULAR RATE: 83 BPM

## 2025-08-04 ENCOUNTER — APPOINTMENT (OUTPATIENT)
Dept: SURGERY | Facility: CLINIC | Age: 70
End: 2025-08-04
Payer: COMMERCIAL

## 2025-08-04 VITALS
SYSTOLIC BLOOD PRESSURE: 146 MMHG | DIASTOLIC BLOOD PRESSURE: 90 MMHG | HEART RATE: 88 BPM | OXYGEN SATURATION: 97 % | BODY MASS INDEX: 35.07 KG/M2 | WEIGHT: 245 LBS | TEMPERATURE: 97.2 F | HEIGHT: 70 IN

## 2025-08-04 DIAGNOSIS — K21.9 GASTROESOPHAGEAL REFLUX DISEASE, UNSPECIFIED WHETHER ESOPHAGITIS PRESENT: ICD-10-CM

## 2025-08-04 DIAGNOSIS — R10.13 EPIGASTRIC PAIN: Primary | ICD-10-CM

## 2025-08-04 DIAGNOSIS — K29.50 CHRONIC GASTRITIS WITHOUT BLEEDING, UNSPECIFIED GASTRITIS TYPE: ICD-10-CM

## 2025-08-04 PROCEDURE — 3008F BODY MASS INDEX DOCD: CPT | Performed by: SURGERY

## 2025-08-04 PROCEDURE — 3080F DIAST BP >= 90 MM HG: CPT | Performed by: SURGERY

## 2025-08-04 PROCEDURE — 99204 OFFICE O/P NEW MOD 45 MIN: CPT | Performed by: SURGERY

## 2025-08-04 PROCEDURE — 1160F RVW MEDS BY RX/DR IN RCRD: CPT | Performed by: SURGERY

## 2025-08-04 PROCEDURE — 1159F MED LIST DOCD IN RCRD: CPT | Performed by: SURGERY

## 2025-08-04 PROCEDURE — 3077F SYST BP >= 140 MM HG: CPT | Performed by: SURGERY

## 2025-08-04 NOTE — PROGRESS NOTES
"Subjective   Patient ID: Garth Sneed is a 70 y.o. male who presents for GERD (Acid reflux).  Also complains in the persistent severe epigastric pain.  HPI as described above.  Patient had the symptoms for about 1 year.  There was no improvement on omeprazole.  Review of Systems GI consistent with epigastric pain.  Review of all other 10 system is negative.  Physical Exam Pupils equal bilaterally, oral mucosa moist, bilateral breath sounds, clear to auscultation, regular rhythm and rate, no murmurs, abdomen is soft, freddy in epigastric area, 3-4 on a scale of 10.  Nondistended, palpable reducible umbilical hernia, 3 cm.  Palpable peripheral pulse, no focal neurological motor deficits.  ENT exam within normal limits.  Musculoskeletal exam within normal limits.      Objective I reviewed all available data including lab results, radiological studies, previous reports and notes.    No diagnosis found.   Problem List[1]   RX Allergies[2]   Medication Documentation Review Audit       Reviewed by Eric Aviles MD (Physician) on 08/04/25 at 0918      Medication Order Taking? Sig Documenting Provider Last Dose Status   albuterol 2.5 mg /3 mL (0.083 %) nebulizer solution [id number] Yes INHALE 3 ML BY MOUTH EVERY 4 HOURS AS NEEDED FOR WHEEZING X 30 DAYS Historical Provider, MD  Active   albuterol 90 mcg/actuation inhaler [id number] Yes Inhale 2 puffs if needed for shortness of breath. Historical Provider, MD  Active   ascorbic acid (Vitamin C) 1,000 mg tablet [id number] Yes Take 1 tablet (1,000 mg) by mouth once daily. Historical Provider, MD  Active   aspirin 81 mg chewable tablet [id number] Yes 1 tablet (81 mg). Historical Provider, MD  Active   atorvastatin (Lipitor) 20 mg tablet [id number] Yes Take 1 tablet (20 mg) by mouth once daily. Santana Spear MD  Active   BD Ultra-Fine Short Pen Needle 31 gauge x 5/16\" needle [id number] Yes INJECT 1 EACH SUBCUTANEOUSLY EVERY 24 HR. GIVE WITH EACH INSULIN ADMINISTRATION. " Historical Provider, MD  Active   cholecalciferol (Vitamin D-3) 50 mcg (2,000 units) capsule [id number] Yes Take 1 capsule (50 mcg) by mouth early in the morning.. Historical Provider, MD  Active   cyanocobalamin (Vitamin B-12) 1,000 mcg tablet [id number] Yes  Historical Provider, MD  Active   dapagliflozin propanediol (Farxiga) 10 mg [id number] Yes Take 1 tablet (10 mg) by mouth once daily. Historical Provider, MD  Active   DOCOSAHEXAENOIC ACID ORAL [id number] Yes Take 1 capsule by mouth once daily. Historical Provider, MD  Active   doxepin (SINEquan) 50 mg capsule [id number]  Take 1 capsule (50 mg) by mouth once daily at bedtime.   Patient not taking: Reported on 2025    Historical Provider, MD  Active   Easy Touch Alcohol Prep Pads pads, medicated [id number] Yes USE TO CLEAN SKIN PRIOR TO CHECKING BLOOD SUGAR AND INJECTING INSULIN Historical Provider, MD  Active   famotidine (Pepcid) 20 mg tablet [id number] Yes TAKE 1 TABLET BY MOUTH 2 TIMES DAILY. TAKE IT 30 MINUTES BEFORE FOOD Historical Provider, MD  Active   fish oil concentrate (Omega-3) 120-180 mg capsule [id number] Yes Take by mouth. Historical Provider, MD  Active   FreeStyle Lite Strips strip [id number] Yes USE 1 STRIP 3 times daily Historical Provider, MD  Active   HYDROcodone-acetaminophen (Norco) 5-325 mg tablet [id number] Yes Take 1 tablet by mouth every 6 hours if needed. Historical Provider, MD  Active   hydrOXYzine HCL (Atarax) 25 mg tablet [id number]  Take 1 tablet (25 mg) by mouth every 6 hours for 3 days. Patrica Austin, APRN-CNP   25 [id number]   Lantus Solostar U-100 Insulin 100 unit/mL (3 mL) pen [id number] Yes INJECT 82 UNITS UNDER THE SKIN AT BEDTIME. Historical Provider, MD  Active   lisinopriL-hydrochlorothiazide 20-12.5 mg tablet [id number] Yes Take 1 tablet by mouth 2 times a day. Santana Spear MD  Active   magnesium oxide (Mag-Ox) 400 mg (241.3 mg elemental) tablet [id number] Yes Take 1 tablet by mouth early in the morning.. Santana ARCOS  MD Beatris  Active   meclizine (Antivert) 25 mg tablet [id number] Yes Take 1 tablet (25 mg) by mouth 3 times a day as needed for dizziness for up to 10 days. Lynda Topete DO  Active   meloxicam (Mobic) 15 mg tablet [id number] Yes TAKE 1 TABLET BY MOUTH DAILY. TAKE IT WITH FOOD Historical Provider, MD  Active   metFORMIN (Glucophage) 1,000 mg tablet [id number] Yes Take 1 tablet (1,000 mg) by mouth twice a day. Historical Provider, MD  Active   metoprolol succinate XL (Toprol-XL) 25 mg 24 hr tablet [id number] Yes Take 1 tablet (25 mg) by mouth once daily. Santana Spear MD  Active   nitroglycerin (Nitrostat) 0.4 mg SL tablet [id number] Yes Place under the tongue every 5 minutes if needed. Historical Provider, MD  Active   omeprazole (PriLOSEC) 40 mg DR capsule [id number] Yes Take 1 capsule (40 mg) by mouth 2 times a day. Historical Provider, MD  Active   oxyBUTYnin XL (Ditropan-XL) 5 mg 24 hr tablet [id number]  Take 1 tablet (5 mg) by mouth once daily.   Patient not taking: Reported on 8/4/2025    Historical MD Ratna  Active   Ozempic 2 mg/dose (8 mg/3 mL) pen injector [id number]  Inject 2 mg under the skin 1 (one) time per week.   Patient not taking: Reported on 8/4/2025    Historical MD Ratna  Active   tiZANidine (Zanaflex) 2 mg tablet [id number]  TAKE 1 TABLET BY MOUTH AT BEDTIME AS NEEDED. TAKE IT FOR NECK PAIN .MAY CAUSE DROWSINESS   Patient not taking: Reported on 8/4/2025    Historical Provider, MD  Active   vitamins A,C,E-zinc-copper (PreserVision AREDS) 2,148 mcg-113 mg-45 mg-17.4mg tablet [id number] Yes Take 1 tablet by mouth once daily. Historical Provider, MD  Active                    Medical History[3]  Tobacco Use History[4]  Family History[5]   Surgical History[6]    Assessment/Plan   The patient with persistent severe epigastric pain, severe GERD, poorly controlled with omeprazole.  The patient has indication for further assessment with EGD to rule out Jaime's esophagus, assessment of severity of  gastritis, potential peptic ulcer disease.  Severity of reflux esophagitis, then follow-up to discuss further management      Eric Aviles MD          [1]   Patient Active Problem List  Diagnosis    Coronary artery disease    Essential hypertension    Hyperlipidemia    Bilateral lower extremity pain    Venous insufficiency    Bilateral leg edema    Hypomagnesemia    Diabetic retinopathy (Multi)    Diabetic neuropathy (Multi)    Exertional dyspnea    Elevated liver enzymes    Fatty liver    History of prostate cancer    Myalgia    Multiple premature ventricular complexes    Lumbar disc herniation    Insomnia    Palpitations    Polymyalgia rheumatica (Multi)    Class 2 obesity with body mass index (BMI) of 35.0 to 35.9 in adult    Status post coronary artery stent placement    Type 2 diabetes mellitus    Tubular adenoma of colon   [2]   Allergies  Allergen Reactions    Dulaglutide Itching    Azithromycin Rash and Unknown   [3]   Past Medical History:  Diagnosis Date    Abnormal findings on diagnostic imaging of other parts of digestive tract     Abnormal UGI series    Cervicalgia     Cervicalgia    Other cervical disc displacement, unspecified cervical region     Cervical disc herniation    Other specified postprocedural states     History of cardiac catheterization    Personal history of other diseases of the circulatory system 12/28/2022    History of sinus bradycardia    Personal history of other diseases of the circulatory system     History of essential hypertension    Personal history of other diseases of the digestive system     History of gastritis    Personal history of other diseases of the digestive system     History of gastroesophageal reflux (GERD)    Personal history of other diseases of the musculoskeletal system and connective tissue     History of backache    Personal history of other diseases of the nervous system and sense organs     History of sleep disturbance    Personal history of other  diseases of the respiratory system 03/14/2018    History of acute sinusitis    Personal history of other diseases of the respiratory system 08/23/2018    History of acute bronchitis    Personal history of other endocrine, nutritional and metabolic disease 03/19/2018    History of hyperlipidemia    Personal history of other medical treatment     History of nuclear stress test    Personal history of other specified conditions     History of chest pain    Radiculopathy, site unspecified     Radiculitis    Spondylosis, unspecified     DJD (degenerative joint disease) of lumbar spine   [4]   Social History  Tobacco Use   Smoking Status Former    Types: Cigarettes   Smokeless Tobacco Never   [5] No family history on file.  [6]   Past Surgical History:  Procedure Laterality Date    CT HEAD ANGIO W AND WO IV CONTRAST  4/10/2019    CT HEAD ANGIO W AND WO IV CONTRAST 4/10/2019 [facility]    OTHER SURGICAL HISTORY  02/14/2018    Previous Stent Placement

## 2025-08-15 LAB — NON-UH HIE POC GLUCOSE: 111 MG/DL (ref 72–100)
